# Patient Record
Sex: MALE | Race: WHITE | Employment: UNEMPLOYED | URBAN - METROPOLITAN AREA
[De-identification: names, ages, dates, MRNs, and addresses within clinical notes are randomized per-mention and may not be internally consistent; named-entity substitution may affect disease eponyms.]

---
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
## 2023-07-05 ENCOUNTER — HOSPITAL ENCOUNTER (OUTPATIENT)
Dept: DATA CONVERSION | Facility: HOSPITAL | Age: 18
End: 2023-07-05
Attending: ORTHOPAEDIC SURGERY | Admitting: ORTHOPAEDIC SURGERY
Payer: COMMERCIAL

## 2023-07-05 DIAGNOSIS — S43.431A SUPERIOR GLENOID LABRUM LESION OF RIGHT SHOULDER, INITIAL ENCOUNTER: ICD-10-CM

## 2023-08-24 ENCOUNTER — HOSPITAL ENCOUNTER (EMERGENCY)
Age: 18
Discharge: HOME | End: 2023-08-24
Payer: COMMERCIAL

## 2023-08-24 VITALS
WEIGHT: 180 LBS | TEMPERATURE: 98 F | BODY MASS INDEX: 25.77 KG/M2 | DIASTOLIC BLOOD PRESSURE: 68 MMHG | HEIGHT: 70 IN | OXYGEN SATURATION: 99 % | HEART RATE: 75 BPM | RESPIRATION RATE: 16 BRPM | SYSTOLIC BLOOD PRESSURE: 113 MMHG

## 2023-08-24 DIAGNOSIS — S01.81XA FACIAL LACERATION, INITIAL ENCOUNTER: Primary | ICD-10-CM

## 2023-08-24 PROCEDURE — 12011 RPR F/E/E/N/L/M 2.5 CM/<: CPT

## 2023-08-24 PROCEDURE — 99282 EMERGENCY DEPT VISIT SF MDM: CPT

## 2023-08-24 ASSESSMENT — ENCOUNTER SYMPTOMS
ABDOMINAL PAIN: 0
PHOTOPHOBIA: 0
VOICE CHANGE: 0
SINUS PRESSURE: 0
SHORTNESS OF BREATH: 0
COLOR CHANGE: 0
DIARRHEA: 0
ALLERGIC/IMMUNOLOGIC NEGATIVE: 1
FACIAL SWELLING: 0
SINUS PAIN: 0
RHINORRHEA: 0
EYE PAIN: 0
WHEEZING: 0
TROUBLE SWALLOWING: 0
VOMITING: 0
EYE DISCHARGE: 0
ABDOMINAL DISTENTION: 0
STRIDOR: 0
CHOKING: 0
COUGH: 0
CONSTIPATION: 0
SORE THROAT: 0
EYE REDNESS: 0
NAUSEA: 0
CHEST TIGHTNESS: 0
BLOOD IN STOOL: 0
EYE ITCHING: 0
APNEA: 0
BACK PAIN: 0

## 2023-08-24 ASSESSMENT — PAIN - FUNCTIONAL ASSESSMENT: PAIN_FUNCTIONAL_ASSESSMENT: NONE - DENIES PAIN

## 2023-08-24 NOTE — ED PROVIDER NOTES
[address] [facility] ED  EMERGENCY DEPARTMENT ENCOUNTER      Pt Name: Janay Haas  MRN: [id number]  [address] [facility] 2005  Date of evaluation: 8/24/2023  Provider: MARY Rivera CNP    CHIEF COMPLAINT       Chief Complaint   Patient presents with    Laceration         HISTORY OF PRESENT ILLNESS   (Location/Symptom, Timing/Onset, Context/Setting, Quality, Duration, Modifying Factors, Severity)  Note limiting factors. Janay Haas is a 21 y.o. male who, per chart review, has past medical history of shoulder surgery presents to the emergency department with c/o laceration to R eyebrow which occurred just prior to arrival in ED. Pt reports he was working on a car when he accidentally got hit in the head with a metal bar. Denies LOC. Denies any other injuries. He did not take any medications prior to arrival.  Immunizations are UTD     Pt denies chest pain, shortness of breath, palpitations, fevers, abdominal pain, nausea, vomiting, diarrhea, dysuria, hematuria, flank pain, incontinence. Nursing Notes were reviewed. REVIEW OF SYSTEMS    (2-9 systems for level 4, 10 or more for level 5)     Review of Systems   Constitutional:  Negative for chills, diaphoresis, fatigue and fever. HENT:  Negative for congestion, dental problem, drooling, ear discharge, ear pain, facial swelling, hearing loss, mouth sores, nosebleeds, postnasal drip, rhinorrhea, sinus pressure, sinus pain, sneezing, sore throat, tinnitus, trouble swallowing and voice change. Eyes:  Negative for photophobia, pain, discharge, redness, itching and visual disturbance. Respiratory:  Negative for apnea, cough, choking, chest tightness, shortness of breath, wheezing and stridor. Cardiovascular:  Negative for chest pain, palpitations and leg swelling. Gastrointestinal:  Negative for abdominal distention, abdominal pain, blood in stool, constipation, diarrhea, nausea and vomiting. Endocrine: Negative.     Genitourinary:

## 2023-10-02 NOTE — OP NOTE
Post Operative Note:     Post-Procedure Diagnosis: Right shoulder anterior  labral tear/Bankart tear   Procedure: 1.  Right shoulder arthroscopic anterior  labral repair/Bankart repair  2.  Right shoulder arthroscopic humeral capsule avulsion repair/HAGL  3.   4.   5.   Surgeon: Booker Lacey MD   Resident/Fellow/Other Assistant: Booker Mcghee PA-C   Estimated Blood Loss (mL): Minimal   Specimen: no   Findings: Anterior labral tear with hagl     Operative Report Dictated:  Dictation: not applicable - note contains Operative  Report   Note Recipients: Bhavana Florence MD - [phone]  [Preferred]   Operative Report:    Indications: 17-year-old male with recurrent right shoulder instability elected proceed with surgery for right shoulder arthroscopic labral repair/Bankart repair      Risks benefits and alternatives to surgery were discussed including but not limited to Infection, bleeding, neurovascular injury, pain and dysfunction, hardware related complications including cutout failure breakage, loss of function, motion, and permanent  disability as well as the cardiovascular and pulmonary complications from anesthesia including death and DVT. Patient and family accept these risks.  We discussed specifically hardware related complications including anchor cut out, failure, breakage, recurrent instability, posttraumatic arthritis, loss in strength, function or motion and the possible need for revision surgeries    Patient identified in the preop area.  Right upper extremity marked and confirmed with patient as the operative site.  Brought back to the operating room and anesthetized under general anesthesia.  Right upper extremity was then prepped and draped in standard sterile fashion.  Patient, site and procedure were confirmed with timeout.  Everyone in the room agreed.  Preop antibiotics were given.  Patient was given a preoperative scalene nerve block.  Placed into a lateral decubitus position with bony  prominences well-padded  Traction was then placed onto the arm    We then made standard glenohumeral working portals beginning posteriorly.  Then created 2 separate anterior mid glenoid portal.  Cannula was placed.  Glenohumeral findings: Patient had a notable anterior Bankart tear which extended from about 2:00 to 6:00.  Also had a notable capsular injury and the loss of some of the humeral attachment of the glenohumeral ligament just deep to subscapularis.  Patient had a notable tear in the anterior labrum which extended from 2:00 to 6:00.  The biceps anchor was intact.  We then sequentially begin elevating the labrum from superior to inferiorly to gain access to the glenoid neck.  Debrided the glenoid neck  with the shaver to increase to healing potential.  Once we had the capsule labrum elevated, we then placed sequential sutures.  Inferiorly we placed a cinch stitch with a 45 degree lasso and a fiber link.  This allowed the access the inferior aspect of  the labrum.  I placed an additional stitches superiorly in a similar sequential fashion.  Once we had full access control of the inferior aspect of the anterior labrum, we then began placing anchors.  Placed our inferior anchor first.  A drill guide was  placed at the glenoid face and unicortical he drilled.  The labrum was then advanced and the sutures were cut flush.  We placed the second anchor more superiorly to advance the capsule and the labrum.  [place] was placed and the sutures were cut flush.   Placed an additional superior most stitch to the labrum.  We used a additional fiber link cinch stitch.  It sequentially passed and placed through anchor.  Was advanced up on the face of the glenoid.  With excellent bite with all of our sutures and we  confirmed improved the stability in the repair of our labral tear.We then addressed his capsular evulsion/Hagl lesion.  Placed a fiber tack anchor just behind the subscapularis after gently debriding the anterior  aspect of the humeral head.  Patient had a capsular rent inferior which was identified.  With a [place] suture passer we placed limbs of our fiber tape through  the capsule.  These were sequentially tied intra-articularly.  Knots were cut after the capsule was advanced to the humerus.  Patient also small posterior tear in the labrum which was incomplete.  We placed the 2 separate posterior capsular stitches to repair the posterior portal as well as oversew the labral tear.  These were tied off of the capsule.  Then closed our posterior  portal through the cannula.  We sequentially placed deep stitch.  #2 Ethibond sutures were used posteriorly..  This concluded the procedure.  Wounds were irrigated with normal saline.  Portals were closed in standard fashion.  Sterile dressings were applied.  Patient placed into a sling.  Patient was then sent to the PACU in stable condition.    Booker Mcghee PA-C was present throughout the entire case. Given the nature of the disease process and the procedure, a skilled surgical first assistant was necessary during the case. The assistant was necessary to hold retractors and to manipulate  the extremity during the procedure. A certified scrub tech was at the back table managing the instruments and supplies for the surgical case.        Electronic Signatures:  Booker Lacey)  (Signed 05-Jul-2023 22:54)   Authored: Post Operative Note, Note Completion      Last Updated: 05-Jul-2023 22:54 by Booker Lacey)

## 2023-10-04 PROBLEM — H69.90 ETD (EUSTACHIAN TUBE DYSFUNCTION): Status: ACTIVE | Noted: 2023-10-04

## 2023-10-04 PROBLEM — T24.232A SECOND DEGREE BURN OF LEFT LOWER LEG: Status: ACTIVE | Noted: 2023-10-04

## 2023-10-04 PROBLEM — H66.90 OTITIS MEDIA, ACUTE: Status: ACTIVE | Noted: 2023-10-04

## 2023-10-04 PROBLEM — R25.1 OCCASIONAL TREMORS: Status: ACTIVE | Noted: 2023-10-04

## 2023-10-04 PROBLEM — H65.92 MIDDLE EAR EFFUSION, LEFT: Status: ACTIVE | Noted: 2023-10-04

## 2023-10-04 PROBLEM — G56.00 CTS (CARPAL TUNNEL SYNDROME): Status: ACTIVE | Noted: 2023-10-04

## 2023-10-04 PROBLEM — H92.09 OTALGIA: Status: ACTIVE | Noted: 2023-10-04

## 2023-10-04 PROBLEM — G89.18 ACUTE POSTOPERATIVE PAIN: Status: ACTIVE | Noted: 2023-10-04

## 2023-10-04 PROBLEM — E86.0 DEHYDRATION AFTER EXERTION: Status: ACTIVE | Noted: 2023-10-04

## 2023-10-04 PROBLEM — R22.31 MASS OF RIGHT WRIST: Status: ACTIVE | Noted: 2023-10-04

## 2023-10-04 PROBLEM — S43.439A GLENOID LABRUM TEAR: Status: ACTIVE | Noted: 2023-10-04

## 2023-10-04 PROBLEM — E16.2 HYPOGLYCEMIA: Status: ACTIVE | Noted: 2023-10-04

## 2023-10-04 PROBLEM — M19.039 ARTHRITIS, WRIST: Status: ACTIVE | Noted: 2023-10-04

## 2023-10-04 PROBLEM — H61.20 CERUMEN IMPACTION: Status: ACTIVE | Noted: 2023-10-04

## 2023-10-04 RX ORDER — ASPIRIN 81 MG
TABLET, DELAYED RELEASE (ENTERIC COATED) ORAL
COMMUNITY
Start: 2022-11-01 | End: 2024-06-07 | Stop reason: WASHOUT

## 2023-10-04 RX ORDER — MULTIVITAMIN
TABLET ORAL
COMMUNITY
Start: 2018-02-19 | End: 2024-06-07 | Stop reason: WASHOUT

## 2023-10-04 RX ORDER — OXYCODONE AND ACETAMINOPHEN 5; 325 MG/1; MG/1
1 TABLET ORAL EVERY 6 HOURS PRN
COMMUNITY
Start: 2023-07-03 | End: 2024-06-07 | Stop reason: WASHOUT

## 2023-10-06 ENCOUNTER — OFFICE VISIT (OUTPATIENT)
Dept: PEDIATRICS | Facility: CLINIC | Age: 18
End: 2023-10-06
Payer: COMMERCIAL

## 2023-10-06 VITALS
HEART RATE: 75 BPM | HEIGHT: 69 IN | BODY MASS INDEX: 24.88 KG/M2 | DIASTOLIC BLOOD PRESSURE: 70 MMHG | WEIGHT: 168 LBS | SYSTOLIC BLOOD PRESSURE: 124 MMHG

## 2023-10-06 DIAGNOSIS — E55.9 VITAMIN D DEFICIENCY: ICD-10-CM

## 2023-10-06 DIAGNOSIS — E55.9 VITAMIN D INSUFFICIENCY: ICD-10-CM

## 2023-10-06 DIAGNOSIS — Z13.0 ENCOUNTER FOR SCREENING FOR HEMATOLOGIC DISORDER: ICD-10-CM

## 2023-10-06 DIAGNOSIS — Z91.89 AT RISK FOR SIDE EFFECT OF MEDICATION: ICD-10-CM

## 2023-10-06 DIAGNOSIS — E78.00 HYPERCHOLESTEROLEMIA: ICD-10-CM

## 2023-10-06 DIAGNOSIS — Z13.220 ENCOUNTER FOR SCREENING FOR LIPID DISORDER: ICD-10-CM

## 2023-10-06 DIAGNOSIS — G89.18 ACUTE POSTOPERATIVE PAIN: ICD-10-CM

## 2023-10-06 DIAGNOSIS — E16.2 HYPOGLYCEMIA: ICD-10-CM

## 2023-10-06 DIAGNOSIS — S43.431S TEAR OF RIGHT GLENOID LABRUM, SEQUELA: ICD-10-CM

## 2023-10-06 DIAGNOSIS — Z00.00 EXAMINATION, ROUTINE, OVER 18 YEARS OF AGE: Primary | ICD-10-CM

## 2023-10-06 DIAGNOSIS — Z28.21 INFLUENZA VACCINATION DECLINED BY PATIENT: ICD-10-CM

## 2023-10-06 DIAGNOSIS — R79.89 LOW TESTOSTERONE IN MALE: ICD-10-CM

## 2023-10-06 DIAGNOSIS — Z78.9 TAKES DIETARY SUPPLEMENTS: ICD-10-CM

## 2023-10-06 PROBLEM — H66.90 OTITIS MEDIA, ACUTE: Status: RESOLVED | Noted: 2023-10-04 | Resolved: 2023-10-06

## 2023-10-06 PROBLEM — H61.20 CERUMEN IMPACTION: Status: RESOLVED | Noted: 2023-10-04 | Resolved: 2023-10-06

## 2023-10-06 PROBLEM — T24.232A SECOND DEGREE BURN OF LEFT LOWER LEG: Status: RESOLVED | Noted: 2023-10-04 | Resolved: 2023-10-06

## 2023-10-06 PROBLEM — H65.92 MIDDLE EAR EFFUSION, LEFT: Status: RESOLVED | Noted: 2023-10-04 | Resolved: 2023-10-06

## 2023-10-06 PROBLEM — E86.0 DEHYDRATION AFTER EXERTION: Status: RESOLVED | Noted: 2023-10-04 | Resolved: 2023-10-06

## 2023-10-06 PROBLEM — R25.1 OCCASIONAL TREMORS: Status: RESOLVED | Noted: 2023-10-04 | Resolved: 2023-10-06

## 2023-10-06 PROBLEM — R22.31 MASS OF RIGHT WRIST: Status: RESOLVED | Noted: 2023-10-04 | Resolved: 2023-10-06

## 2023-10-06 PROBLEM — H69.90 ETD (EUSTACHIAN TUBE DYSFUNCTION): Status: RESOLVED | Noted: 2023-10-04 | Resolved: 2023-10-06

## 2023-10-06 PROBLEM — H92.09 OTALGIA: Status: RESOLVED | Noted: 2023-10-04 | Resolved: 2023-10-06

## 2023-10-06 PROCEDURE — 3008F BODY MASS INDEX DOCD: CPT | Performed by: PEDIATRICS

## 2023-10-06 PROCEDURE — 99395 PREV VISIT EST AGE 18-39: CPT | Performed by: PEDIATRICS

## 2023-10-06 NOTE — PROGRESS NOTES
Patient ID: Merritt Brown is a 18 y.o. male who presents for Annual Exam (Wants blood work).  Today he is un-accompanied.        HERE FOR 19 YO ANNUAL VISIT    LAST WELL VISIT      1.  Right shoulder dislocation June 2023   S/p  right shoulder arthroscopic labral repair/Bankart repair July 5, 2023   Physical therapy since then  Last seen by ortho Sept 15, 2023   Still unable to move right shoulder through full range of motion   PT has not cleared   Does not lift   Only cardiovascular activity       2. 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    The patient denies all TB risk factors     Mental Health:  A screening questionnaire for depression was negative.      Tobacco:  Denies use  Alcohol:  Denies use  Drugs:  Denies use  Sexual activity:  Denies current or past sexual activity    Diet:   Using pre-work out    The patient was advised to consume 3 servings of green vegetables per day (if not, adherence with a MVI was stressed).  The patient was advised to consume a minimum of 2 servings of meat per week (if not, adherence with a MVI was stressed).  The patient was advised to assure 1000 mg of Calcium and 1000 IU's of Vitamin D per day.  Discussion of supplementing with Caltrate-D was advised is dairy product consumption is not sufficient.  All concerns and questions regarding diet, nutrition, and eating habits were addressed.     Elimination:  The patient denies concerns regarding chronic constipation or diarrhea.  Voiding:  The patient denies  concerns regarding urination or urinary symptoms.  Sleep:  The patient denies concerns regarding sleep; specifically there are no issues regarding the patients ability to fall asleep, stay asleep, or sleep throughout the night.    HOME LIFE:  There are no concerns with regards to the patient's relationships at home.    Current Outpatient Medications:     carbamide peroxide (Debrox) 6.5 % otic solution, Administer into affected ear(s)., Disp: , Rfl:     multivitamin tablet, Take by mouth., Disp: , Rfl:     oxyCODONE-acetaminophen (Percocet) 5-325 mg tablet, Take 1 tablet by mouth every 6 hours if needed., Disp: , Rfl:     pediatric multivitamin tablet chewable split tablet, Take by mouth., Disp: , Rfl:     Past Medical History:   Diagnosis Date    Achilles tendinitis, unspecified leg 03/21/2018    Achilles tendinitis    Acute upper respiratory infection, unspecified 04/11/2019    Acute upper respiratory infection    Allergic contact dermatitis due to plants, except food 08/05/2015    Contact dermatitis due to poison ivy    Body mass index (BMI) pediatric, 5th percentile to less than 85th percentile for age 10/16/2019    BMI (body mass index), pediatric, 5% to less than 85% for age    Encounter for immunization 11/20/2020    Encounter for administration of vaccine    Encounter for routine child health examination without abnormal findings 10/17/2019    Encounter for routine child health examination without abnormal findings    Encounter for routine child health examination without abnormal findings 11/17/2017    Encounter for routine child health examination w/o abnormal findings    Hypoglycemia 10/04/2023    Iliotibial band syndrome, unspecified leg 03/17/2017    Iliotibial band syndrome    Immunization not carried out because of patient decision for unspecified reason 10/16/2019    Immunization not carried out because of patient decision    Other acute nonsuppurative otitis media, unspecified ear 08/05/2015    Acute  nonsuppurative otitis media    Other conditions influencing health status 02/22/2017    History of cough    Other infective otitis externa, unspecified ear 07/29/2013    Infective otitis externa    Otitis media, unspecified, left ear 04/11/2019    Acute left otitis media    Otitis media, unspecified, unspecified ear 10/16/2019    Recurrent acute otitis media    Overweight 11/10/2017    Overweight, pediatric, BMI 85.0-94.9 percentile for age    Pain in left hip 03/17/2017    Left hip pain in pediatric patient    Pain in unspecified ankle and joints of unspecified foot 03/21/2018    Ankle pain    Personal history of diseases of the skin and subcutaneous tissue 11/18/2021    History of cellulitis    Personal history of diseases of the skin and subcutaneous tissue 09/21/2016    History of impetigo    Personal history of diseases of the skin and subcutaneous tissue 07/24/2015    History of sunburn    Personal history of other diseases of the nervous system and sense organs 02/22/2017    History of acute otitis media    Personal history of other diseases of the nervous system and sense organs 07/29/2013    History of acute otitis media    Personal history of other diseases of the respiratory system 02/22/2017    History of acute pharyngitis    Personal history of other diseases of the respiratory system 11/03/2021    History of acute pharyngitis    Personal history of other diseases of the respiratory system 02/18/2014    History of upper respiratory infection    Personal history of other drug therapy 11/10/2017    History of influenza vaccination    Personal history of other infectious and parasitic diseases 09/21/2016    History of viral exanthem    Rash and other nonspecific skin eruption 09/21/2016    Rash    Unspecified hearing loss, unspecified ear 11/03/2014    Hearing difficulty    Unspecified injury of left foot, initial encounter 02/19/2018    Injury of small toe, left, initial encounter    Unspecified injury of  "unspecified ankle, initial encounter 03/21/2018    Ankle injury       Past Surgical History:   Procedure Laterality Date    TONSILLECTOMY  04/24/2013    Tonsillectomy With Adenoidectomy    TYMPANOSTOMY TUBE PLACEMENT  04/24/2013    Ear Pressure Equalization Tube       Family History   Problem Relation Name Age of Onset    Asthma Sister      Asthma Brother      Other (Blood pressure isolated elevated) Maternal Grandmother      Hyperlipidemia Maternal Grandmother      Other (Myocardial infarctin) Maternal Grandfather         Social History     Tobacco Use    Smoking status: Never     Passive exposure: Never    Smokeless tobacco: Never   Vaping Use    Vaping Use: Never used       Objective   /70   Pulse 75   Ht 1.753 m (5' 9\")   Wt 76.2 kg (168 lb)   BMI 24.81 kg/m²   BSA: 1.93 meters squared        BMI: Body mass index is 24.81 kg/m².   Growth percentiles: Height:  Facility age limit for growth %lyndsey is 20 years.   Weight:  Facility age limit for growth %lyndsey is 20 years.  BMI:  Facility age limit for growth %lyndsey is 20 years.    PHYSICAL EXAM  General  General Appearance - Not in acute distress, Not Irritable, Not Lethargic / Slow.  Mental Status - Alert.  Build & Nutrition - Well developed and Well nourished.  Hydration - Well hydrated.    Integumentary  - - warm and dry with no rashes, normal skin turgor and scalp and hair without rash, or lesion.    Head and Neck  - - normalocephalic, neck supple, thyroid normal size and consistancy and no lymphadenopathy.  Head    Fontanelles and Sutures: Anterior [place] - Characteristics - closed. Posterior [place] - Characteristics - closed.  Neck  Global Assessment - full range of motion, non-tender, No lymphadenopathy, no nucchal rigidty, no torticollis.  Trachea - midline.    Eye  - - Bilateral - pupils equal and round (No strabismus), sclera clear and lids pink without edema or mass.  Fundi - Bilateral - Normal.    ENMT  - - Bilateral - TM pearly grey with " good light reflex, external auditory canal pink and dry, nasopharynx moist and pink and oropharynx moist and pink, tonsils normal, uvula midline .  Ears  Pinna - Bilateral - no generalized tenderness observed. External Auditory Canal - Bilateral - no edema noted in EAC, no drainage observed.  Mouth and Throat  Oral Cavity/Oropharynx - Hard Palate - no asymmetry observed, no erythema noted. Soft Palate - no asymmetry noted, no erythema noted. Oral Mucosa - moist.    Chest and Lung Exam  - - Bilateral - clear to auscultation, normal breathing effort and no chest deformity.  Inspection  Movements - Normal and Symmetrical. Accessory muscles - No use of accessory muscles in breathing.    Breast  - - Bilateral - symmetry, no mass palpable, no skin change and no nipple discharge.    Cardiovascular  - - regular rate and rhythm and no murmur, rub, or thrill.    Abdomen  - - soft, nontender, normal bowel sounds and no hepatomegaly, splenomegaly, or mass.  Inspection  Inspection of the abdomen reveals - No Abnormal pulsations, No Paradoxical movements and No Hernias. Skin - Inspection of the skin of the abdomen reveals - No Stria and No Ecchymoses.  Palpation/Percussion  Palpation and Percussion of the abdomen reveal - Soft, Non Tender, No Rebound tenderness, No Rigidity (guarding), No Abnormal dullness to percussion, No Abnormal tympany to percussion, No hepatosplenomegaly, No Palpable abdominal masses and No Subcutaneous crepitus.  Auscultation  Auscultation of the abdomen reveals - Bowel sounds normal, No Abdominal bruits and No Venous hums.    Male Genitourinary  - - Bilateral - normal circumcised phallus, testicle smooth, round, and normal size and no palpable inguinal hernia.  Evaluation of genitourinary system reveals - scrotum non-tender, no masses, normal testes, no palpable masses, urethral meatus normal, no discharge, normal penis and normal anus and perineum, no lesions.  Sexual Maturity  Ty 5 - Adult hair  pattern, Adult penile size and shape and Adult testicular size and shape.    Peripheral Vascular  - - Bilateral - peripheral pulses palpable in upper and lower extremity and no edema present.  Upper Extremity  Inspection - Bilateral - No Cyanotic nailbeds, No Delayed capillary refill, no Digital clubbing, No Erythema, Not Pale, No Petechiae. Palpation - Temperature - Bilateral - Normal.  Lower Extremity  Inspection - Bilateral - No Cyanotic nailbeds, No Delayed capillary refill, No Erythema, Not Pale. Palpation - Temperature - Bilateral - Normal.    Neurologic  - - normal sensation, cranial nerves II-XII intact and deep tendon reflexes normal.  Neurologic evaluation reveals  - normal sensation, normal coordination and upper and lower extremity deep tendon reflexes intact bilaterally .  Mental Status  Affect - normal. Speech - Normal. Thought content/perception - Normal. Cognitive function - Normal.  Cranial Nerves  III Oculomotor - Pupillary constriction - Bilateral - Normal. Eye Movements - Nystagmus - Bilateral - None.  Overall Assessment of Muscle Strength and Tone reveals  Upper Extremities - Right Deltoid - 5/5. Left Deltoid - 5/5. Right Bicep - 5/5. Left Bicep - 5/5. Right Tricep - 5/5. Left Tricep - 5/5. Right Intrinsics - 5/5. Left Intrinsics - 5/5. Lower Extremities - Right Iliopsoas - 5/5. Left Iliopsoas - 5/5. Right Quadriceps - 5/5. Left Quadriceps - 5/5. Right Hamstrings - 5/5. Left Hamstrings - 5/5. Right Tibialis Anterior - 5/5. Left Tibialis Anterior - 5/5. Right Gastroc-Soleus - 5/5. Left Gastroc-Soleus - 5/5.  Meningeal Signs - None.    Musculoskeletal  UNABLE TO EXAMINE RIGHT SHOULDER DUE TO PAIN AND LIMITED MOTION   - - normal posture, normal gait and station, Head and neck are symmetric, no deformities, masses or tenderness, Head and neck show normal ROM without pain or weakness, Spine shows normal curvatures full ROM without pain or weakness, Upper extremities show normal ROM without pain or  weakness, Lower extremities show full ROM without pain or weakness and Patient is able to heel walk, toe walk, and duck walk.  Lower Extremity  Hip - Examination of the right hip reveals - no instability, subluxation or laxity. Examination of the left hip reveals - no instability, subluxation or laxity.    Lymphatic  - - Bilateral - no lymphadenopathy.        Assessment/Plan   Problem List Items Addressed This Visit       RESOLVED: Hypoglycemia    Relevant Orders    Gamma-Glutamyl Transferase    Amylase    Glenoid labrum tear    Acute postoperative pain     Other Visit Diagnoses       Examination, routine, over 18 years of age    -  Primary    Relevant Orders    Vitamin D 25-Hydroxy,Total (for eval of Vitamin D levels)    Lipid Panel    CBC and Auto Differential    Gamma-Glutamyl Transferase    Amylase    Vitamin D deficiency        Relevant Orders    Vitamin D 25-Hydroxy,Total (for eval of Vitamin D levels)    Encounter for screening for lipid disorder        Relevant Orders    Lipid Panel    Gamma-Glutamyl Transferase    Amylase    Encounter for screening for hematologic disorder        Relevant Orders    CBC and Auto Differential    Pediatric body mass index (BMI) of 5th percentile to less than 85th percentile for age        Influenza vaccination declined by patient                Immunization History   Administered Date(s) Administered    DTaP vaccine, pediatric  (INFANRIX) 2003, 04/05/2004, 01/03/2005, 09/29/2008    Flu vaccine (IIV4), preservative free *Check age/dose* 11/17/2015, 11/09/2016, 11/10/2017, 10/01/2018, 01/19/2023    HPV, Quadrivalent 10/31/2014, 01/02/2015, 05/04/2015    Hep A, Unspecified 09/21/2012, 03/26/2013    Hepatitis B vaccine, adult (RECOMBIVAX, ENGERIX) 2003, 04/05/2004    HiB PRP-OMP conjugate vaccine, pediatric (PEDVAXHIB) 2003, 01/03/2005    Influenza, Unspecified 09/28/2011, 09/21/2012    Influenza, seasonal, injectable 09/06/2013, 10/31/2014    MMR vaccine,  "subcutaneous (MMR II) 09/27/2004, 09/29/2008    Meningococcal ACWY vaccine (MENVEO) 10/16/2019    Meningococcal B vaccine (BEXSERO) 10/19/2020, 12/14/2020    Meningococcal MCV4P 10/31/2014    Pneumococcal Conjugate PCV 7 2003, 08/16/2004, 01/03/2005    Poliovirus vaccine, subcutaneous (IPOL) 2003, 04/05/2004, 09/29/2008    Tdap vaccine, age 7 year and older (BOOSTRIX) 10/31/2014    Varicella vaccine, subcutaneous (VARIVAX) 09/27/2004, 09/29/2008     History of previous adverse reactions to immunizations? no  The following portions of the patient's history were reviewed by a provider in this encounter and updated as appropriate:       Well Child 12-22 Year    Objective   Vitals:    10/06/23 1109   BP: 124/70   Pulse: 75   Weight: 76.2 kg (168 lb)   Height: 1.753 m (5' 9\")     Growth parameters are noted and are appropriate for age.    Assessment/Plan   18 YEAR OLD male for annual well visit  Normal growth   Normal development   Immunizations: up to date; influenza declined   Vision and hearing: no concerns  STD done in past, not sexually active    Mom's concern since child using pre-workout supplement   Screening lipid/cmp/cbc ordered     Depression screen low rosk     1. Anticipatory guidance discussed.  Gave handout on well-child issues at this age.  Specific topics reviewed: drugs, ETOH, and tobacco, importance of regular dental care, importance of regular exercise, importance of varied diet, limit TV, media violence, minimize junk food, seat belts, sex; STD and pregnancy prevention, and testicular self-exam.  2.  Weight management:  The patient was counseled regarding nutrition and physical activity.  3. Development: appropriate for age  4.   Orders Placed This Encounter   Procedures    Vitamin D 25-Hydroxy,Total (for eval of Vitamin D levels)    Lipid Panel    CBC and Auto Differential    Gamma-Glutamyl Transferase    Amylase     5. Follow-up visit in 1 year for next well child visit, or sooner as " needed.      Advised to transition to adult care physician by next year    Bhavana Florence MD        ADDENDUM 11/6/2023 REVIEWED FINAL LAB RESULTS   Hypercholeterolemia   Vitamin D insufficiency 20   Low total testosterone     My chart message sent to patient regarding low testosterone.   Order for endocrinology referral in chart  Advised to stop otc pre-workout supplement for     Bhavana Florence MD

## 2023-10-13 ENCOUNTER — TREATMENT (OUTPATIENT)
Dept: PHYSICAL THERAPY | Facility: CLINIC | Age: 18
End: 2023-10-13
Payer: COMMERCIAL

## 2023-10-13 DIAGNOSIS — M25.511 RIGHT SHOULDER PAIN: ICD-10-CM

## 2023-10-13 DIAGNOSIS — S43.431D TYPE I SLAP LESION, RIGHT, SUBSEQUENT ENCOUNTER: Primary | ICD-10-CM

## 2023-10-13 PROCEDURE — 97110 THERAPEUTIC EXERCISES: CPT | Mod: GP,CQ

## 2023-10-13 ASSESSMENT — PAIN - FUNCTIONAL ASSESSMENT: PAIN_FUNCTIONAL_ASSESSMENT: 0-10

## 2023-10-13 ASSESSMENT — PAIN SCALES - GENERAL: PAINLEVEL_OUTOF10: 0 - NO PAIN

## 2023-10-13 NOTE — PROGRESS NOTES
Physical Therapy Treatment    Patient Name: Merritt Brown  MRN: [id number]  Today's Date: 10/13/2023  Time Calculation  Start Time: 0805  Stop Time: 0845  Time Calculation (min): 40 min    Current Problem  1. Type I SLAP lesion, right, subsequent encounter        2. Right shoulder pain            Subjective   General   Pt doing well at this point.  Denies issues other than weakness.   Precautions  Precautions  Precautions Comment: None  Pain  Pain Assessment: 0-10  Pain Score: 0 - No pain  Insurance  Visit number: 8   Approved number of visits: 30   Authorization not required after evaluation   [place]/MMO     Objective   Treatments:   UBE f/r ML5 3'/3'  Row w/ ER BkTB x30  LAE BkTB x30  H.AB BkTB x30  OH press w/ ant anchor BkTB x30  PNF diag BkTB x30  SA slides BTB x30  Plank to hi-lo sidestepping BTB 6L  Plank SA press 2x15  Prone I,T,Y 3# 2x10  Prone W hands behind head 2x15  Wall angels x30  OH press 5# x30  Supine lat pullovers 10# x30  Jobes 4# x20  Bodyblade OH 2-way TF x3  Bodyblade IR/ER TF x2    Assessment    Pt without pain today.  Tolerating progression extremely well.  Showing good overall GH and scapular control, but still with some weaknesses throughout.  Discussed gradual return to recreational gym activity.    Plan:  Progress with POC as tolerated.   Focus on scapular retraction/depression particularly working away from body and OH.    OP EDUCATION:       Goals:

## 2023-10-20 ENCOUNTER — TREATMENT (OUTPATIENT)
Dept: PHYSICAL THERAPY | Facility: CLINIC | Age: 18
End: 2023-10-20
Payer: COMMERCIAL

## 2023-10-20 DIAGNOSIS — M25.511 RIGHT SHOULDER PAIN: ICD-10-CM

## 2023-10-20 DIAGNOSIS — S43.431D TYPE I SLAP LESION, RIGHT, SUBSEQUENT ENCOUNTER: Primary | ICD-10-CM

## 2023-10-20 PROCEDURE — 97110 THERAPEUTIC EXERCISES: CPT | Mod: GP,CQ

## 2023-10-20 ASSESSMENT — PAIN - FUNCTIONAL ASSESSMENT: PAIN_FUNCTIONAL_ASSESSMENT: 0-10

## 2023-10-20 ASSESSMENT — PAIN SCALES - GENERAL: PAINLEVEL_OUTOF10: 0 - NO PAIN

## 2023-10-20 NOTE — PROGRESS NOTES
"Physical Therapy Treatment    Patient Name: Merritt Brown  MRN: [id number]  Today's Date: 10/20/2023  Time Calculation  Start Time: 0800  Stop Time: 0825  Time Calculation (min): 25 min    Current Problem  1. Type I SLAP lesion, right, subsequent encounter        2. Right shoulder pain            Subjective   General   Pt doing well overall.  Denies much in the way of issue.  Has been working and going to the gym regularly without problem.   Precautions  Precautions  Precautions Comment: None  Pain  Pain Assessment: 0-10  Pain Score: 0 - No pain  Insurance  Visit number: 9  Approved number of visits: 30   Authorization not required after evaluation   [place]/MMO    Objective   Treatments:   UBE f/r ML6 3'/3'  Row w/ ER BkTB x30  ER w/ punch BkTB x30  LAE BkTB -  H.AB BkTB --  OH press w/ ant anchor BkTB --  PNF diag BkTB --  SA slides BTB --  Plank to hi-lo sidestepping BTB --  Plank SA press --  Prone I,T,Y 3# --  Prone W hands behind head 2x15  Wall angels x30  OH press 5# --  Supine lat pullovers 10# --  Jobes 4# --  Bodyblade OH 2-way TF x3  Bodyblade IR/ER TF x2  OH PB dribble 30\"    ROM WNL     Strength 5/5 with exception rhomboids, MT, LT 4+/5,     QuickDASH: 10/20/23 0.00%     Assessment    Pt doing extremely well at this point.  No pain with activity and showing good GH/parascapular control and sequencing.  Discussed increasing recreational gym activity, exercise management, and gradual return to PLOF.  Discussed some additional bodyweight and endurance focused exercises to progress tolerance to \"odd positioning\" required by occupation.     Plan:  Discharge pt; pt achieved all goals established.        OP EDUCATION:       Goals:   Pain: Patient report 0/10 pain with all work activities for evidence of improved stability and tolerance to full duty  work     MET    Participation Restrictions: Decrease DASH to < or equal to 6% disabled for increased functional ability    MET    Range Of " Motion/Joint Mobility: Increase AROM right shoulder flexion and abduction to 150 degrees, ER @ 90 degrees abd to > or equal to 80 degrees, and HBB to > or equal to T12 for ease with reaching OH and behind back    MET    Strength: Increase strength right deltoid and RC to 5/5 without pain for ease with lifting and stabilizing for tolerance to OH activities     MET    HEP, Independent with HEP to expedite progress and promote goal achievement    MET    Postural strength, Increase rylie-scapular strength to 4+/5 for ease with lifting and stabilizing OH for work tasks   MET

## 2023-10-24 ENCOUNTER — PATIENT MESSAGE (OUTPATIENT)
Dept: PEDIATRICS | Facility: CLINIC | Age: 18
End: 2023-10-24

## 2023-10-24 ENCOUNTER — LAB (OUTPATIENT)
Dept: LAB | Facility: HOSPITAL | Age: 18
End: 2023-10-24
Payer: COMMERCIAL

## 2023-10-24 DIAGNOSIS — Z00.00 EXAMINATION, ROUTINE, OVER 18 YEARS OF AGE: ICD-10-CM

## 2023-10-24 DIAGNOSIS — Z78.9 TAKES DIETARY SUPPLEMENTS: ICD-10-CM

## 2023-10-24 DIAGNOSIS — Z13.0 ENCOUNTER FOR SCREENING FOR HEMATOLOGIC DISORDER: ICD-10-CM

## 2023-10-24 DIAGNOSIS — Z91.89 AT RISK FOR SIDE EFFECT OF MEDICATION: ICD-10-CM

## 2023-10-24 DIAGNOSIS — Z13.220 ENCOUNTER FOR SCREENING FOR LIPID DISORDER: ICD-10-CM

## 2023-10-24 DIAGNOSIS — E55.9 VITAMIN D DEFICIENCY: ICD-10-CM

## 2023-10-24 DIAGNOSIS — E16.2 HYPOGLYCEMIA: ICD-10-CM

## 2023-10-24 LAB
25(OH)D3 SERPL-MCNC: 20 NG/ML (ref 30–100)
AMYLASE SERPL-CCNC: 30 U/L (ref 29–103)
BASOPHILS # BLD AUTO: 0.02 X10*3/UL (ref 0–0.1)
BASOPHILS NFR BLD AUTO: 0.3 %
CHOLEST SERPL-MCNC: 196 MG/DL (ref 0–199)
CHOLESTEROL/HDL RATIO: 15
EOSINOPHIL # BLD AUTO: 0.06 X10*3/UL (ref 0–0.7)
EOSINOPHIL NFR BLD AUTO: 0.9 %
ERYTHROCYTE [DISTWIDTH] IN BLOOD BY AUTOMATED COUNT: 13.3 % (ref 11.5–14.5)
ESTRADIOL SERPL-MCNC: <19 PG/ML
GGT SERPL-CCNC: 9 U/L (ref 5–64)
HCT VFR BLD AUTO: 44.6 % (ref 41–52)
HDLC SERPL-MCNC: 13.1 MG/DL
HGB BLD-MCNC: 15 G/DL (ref 13.5–17.5)
IMM GRANULOCYTES # BLD AUTO: 0.01 X10*3/UL (ref 0–0.7)
IMM GRANULOCYTES NFR BLD AUTO: 0.2 % (ref 0–0.9)
LDLC SERPL CALC-MCNC: 156 MG/DL
LYMPHOCYTES # BLD AUTO: 1.44 X10*3/UL (ref 1.2–4.8)
LYMPHOCYTES NFR BLD AUTO: 22 %
MCH RBC QN AUTO: 31.2 PG (ref 26–34)
MCHC RBC AUTO-ENTMCNC: 33.6 G/DL (ref 32–36)
MCV RBC AUTO: 93 FL (ref 80–100)
MONOCYTES # BLD AUTO: 0.59 X10*3/UL (ref 0.1–1)
MONOCYTES NFR BLD AUTO: 9 %
NEUTROPHILS # BLD AUTO: 4.44 X10*3/UL (ref 1.2–7.7)
NEUTROPHILS NFR BLD AUTO: 67.6 %
NON HDL CHOLESTEROL: 183 MG/DL (ref 0–119)
NRBC BLD-RTO: 0 /100 WBCS (ref 0–0)
PLATELET # BLD AUTO: 268 X10*3/UL (ref 150–450)
PMV BLD AUTO: 10.2 FL (ref 7.5–11.5)
RBC # BLD AUTO: 4.81 X10*6/UL (ref 4.5–5.9)
TRIGL SERPL-MCNC: 133 MG/DL (ref 0–149)
TSH SERPL-ACNC: 0.8 MIU/L (ref 0.44–3.98)
VLDL: 27 MG/DL (ref 0–40)
WBC # BLD AUTO: 6.6 X10*3/UL (ref 4.4–11.3)

## 2023-10-24 PROCEDURE — 36415 COLL VENOUS BLD VENIPUNCTURE: CPT

## 2023-10-24 PROCEDURE — 82670 ASSAY OF TOTAL ESTRADIOL: CPT | Mod: ELYLAB

## 2023-10-24 PROCEDURE — 82306 VITAMIN D 25 HYDROXY: CPT

## 2023-10-24 PROCEDURE — 85025 COMPLETE CBC W/AUTO DIFF WBC: CPT

## 2023-10-24 PROCEDURE — 82150 ASSAY OF AMYLASE: CPT

## 2023-10-24 PROCEDURE — 82977 ASSAY OF GGT: CPT

## 2023-10-24 PROCEDURE — 80061 LIPID PANEL: CPT

## 2023-10-24 PROCEDURE — 84402 ASSAY OF FREE TESTOSTERONE: CPT

## 2023-10-24 PROCEDURE — 84443 ASSAY THYROID STIM HORMONE: CPT

## 2023-10-27 ENCOUNTER — APPOINTMENT (OUTPATIENT)
Dept: PHYSICAL THERAPY | Facility: CLINIC | Age: 18
End: 2023-10-27
Payer: COMMERCIAL

## 2023-10-27 LAB
TESTOSTERONE FREE (CHAN): 35.4 PG/ML (ref 35–155)
TESTOSTERONE,TOTAL,LC-MS/MS: 105 NG/DL (ref 250–1100)

## 2023-11-03 ENCOUNTER — APPOINTMENT (OUTPATIENT)
Dept: PHYSICAL THERAPY | Facility: CLINIC | Age: 18
End: 2023-11-03
Payer: COMMERCIAL

## 2023-11-13 NOTE — PROGRESS NOTES
11/13/2023 see my chart message.     Patient prefers to stop taking otc supplement and recheck levels.   Patient wants to hold off on being seen by Endocrinology at this time.     Follow up labs ordered for after Jan 2024     Bhavana Florence MD

## 2023-11-17 ENCOUNTER — ANCILLARY PROCEDURE (OUTPATIENT)
Dept: RADIOLOGY | Facility: CLINIC | Age: 18
End: 2023-11-17
Payer: COMMERCIAL

## 2023-11-17 ENCOUNTER — OFFICE VISIT (OUTPATIENT)
Dept: ORTHOPEDIC SURGERY | Facility: CLINIC | Age: 18
End: 2023-11-17
Payer: COMMERCIAL

## 2023-11-17 DIAGNOSIS — M25.511 RIGHT SHOULDER PAIN, UNSPECIFIED CHRONICITY: Primary | ICD-10-CM

## 2023-11-17 DIAGNOSIS — M25.511 RIGHT SHOULDER PAIN, UNSPECIFIED CHRONICITY: ICD-10-CM

## 2023-11-17 PROCEDURE — 73030 X-RAY EXAM OF SHOULDER: CPT | Mod: RIGHT SIDE | Performed by: ORTHOPAEDIC SURGERY

## 2023-11-17 PROCEDURE — 73030 X-RAY EXAM OF SHOULDER: CPT | Mod: RT

## 2023-11-17 PROCEDURE — 99213 OFFICE O/P EST LOW 20 MIN: CPT | Mod: 25,27 | Performed by: ORTHOPAEDIC SURGERY

## 2023-11-17 PROCEDURE — 3008F BODY MASS INDEX DOCD: CPT | Performed by: ORTHOPAEDIC SURGERY

## 2023-11-17 PROCEDURE — 99213 OFFICE O/P EST LOW 20 MIN: CPT | Performed by: ORTHOPAEDIC SURGERY

## 2023-11-17 NOTE — PROGRESS NOTES
History of Present Illness   Patient presents for follow-up from his right shoulder Bankart repair.  States that he is done with physical therapy he has been lifting at the gym not doing any type of Olympic lifting.  States that he denies any instability the shoulder.     Review of Systems   GENERAL: Negative for malaise, significant weight loss, fever  MUSCULOSKELETAL: see HPI  NEURO:  Negative     Physical Exam  General appearance well-nourished well-developed ANO x3 no acute distress  Right shoulder exam shows incisions well-healed.  Full active range of motion of the shoulder 5 out of 5 strength with manual muscle strength testing good stability when testing the joint capsule.  Upper extremity gross neurovascular intact     Imaging  Intact labral repair     Assessment   Status post right shoulder Bankart repair     Plan  I encouraged the patient to keep working on strength and endurance.  Avoid Olympic type lifting.  At this point we will see the patient back on as-needed basis.  Ice and ibuprofen for pain relief    Past Medical History:   Diagnosis Date    Achilles tendinitis, unspecified leg 03/21/2018    Achilles tendinitis    Acute upper respiratory infection, unspecified 04/11/2019    Acute upper respiratory infection    Allergic contact dermatitis due to plants, except food 08/05/2015    Contact dermatitis due to poison ivy    Body mass index (BMI) pediatric, 5th percentile to less than 85th percentile for age 10/16/2019    BMI (body mass index), pediatric, 5% to less than 85% for age    Encounter for immunization 11/20/2020    Encounter for administration of vaccine    Encounter for routine child health examination without abnormal findings 10/17/2019    Encounter for routine child health examination without abnormal findings    Encounter for routine child health examination without abnormal findings 11/17/2017    Encounter for routine child health examination w/o abnormal findings    Hypoglycemia  10/04/2023    Iliotibial band syndrome, unspecified leg 03/17/2017    Iliotibial band syndrome    Immunization not carried out because of patient decision for unspecified reason 10/16/2019    Immunization not carried out because of patient decision    Other acute nonsuppurative otitis media, unspecified ear 08/05/2015    Acute nonsuppurative otitis media    Other conditions influencing health status 02/22/2017    History of cough    Other infective otitis externa, unspecified ear 07/29/2013    Infective otitis externa    Otitis media, unspecified, left ear 04/11/2019    Acute left otitis media    Otitis media, unspecified, unspecified ear 10/16/2019    Recurrent acute otitis media    Overweight 11/10/2017    Overweight, pediatric, BMI 85.0-94.9 percentile for age    Pain in left hip 03/17/2017    Left hip pain in pediatric patient    Pain in unspecified ankle and joints of unspecified foot 03/21/2018    Ankle pain    Personal history of diseases of the skin and subcutaneous tissue 11/18/2021    History of cellulitis    Personal history of diseases of the skin and subcutaneous tissue 09/21/2016    History of impetigo    Personal history of diseases of the skin and subcutaneous tissue 07/24/2015    History of sunburn    Personal history of other diseases of the nervous system and sense organs 02/22/2017    History of acute otitis media    Personal history of other diseases of the nervous system and sense organs 07/29/2013    History of acute otitis media    Personal history of other diseases of the respiratory system 02/22/2017    History of acute pharyngitis    Personal history of other diseases of the respiratory system 11/03/2021    History of acute pharyngitis    Personal history of other diseases of the respiratory system 02/18/2014    History of upper respiratory infection    Personal history of other drug therapy 11/10/2017    History of influenza vaccination    Personal history of other infectious and  parasitic diseases 09/21/2016    History of viral exanthem    Rash and other nonspecific skin eruption 09/21/2016    Rash    Unspecified hearing loss, unspecified ear 11/03/2014    Hearing difficulty    Unspecified injury of left foot, initial encounter 02/19/2018    Injury of small toe, left, initial encounter    Unspecified injury of unspecified ankle, initial encounter 03/21/2018    Ankle injury       Medication Documentation Review Audit    **Prior to Admission medications have not yet been reviewed**         No Known Allergies    Social History     Socioeconomic History    Marital status: Single     Spouse name: Not on file    Number of children: Not on file    Years of education: Not on file    Highest education level: Not on file   Occupational History    Not on file   Tobacco Use    Smoking status: Never     Passive exposure: Never    Smokeless tobacco: Never   Vaping Use    Vaping Use: Never used   Substance and Sexual Activity    Alcohol use: Not on file    Drug use: Not on file    Sexual activity: Not Currently   Other Topics Concern    Not on file   Social History Narrative    Not on file     Social Determinants of Health     Financial Resource Strain: Not on file   Food Insecurity: Not on file   Transportation Needs: Not on file   Physical Activity: Not on file   Stress: Not on file   Social Connections: Not on file   Intimate Partner Violence: Not on file   Housing Stability: Not on file       Past Surgical History:   Procedure Laterality Date    TONSILLECTOMY  04/24/2013    Tonsillectomy With Adenoidectomy    TYMPANOSTOMY TUBE PLACEMENT  04/24/2013    Ear Pressure Equalization Tube

## 2023-11-27 ENCOUNTER — OFFICE VISIT (OUTPATIENT)
Dept: PEDIATRICS | Facility: CLINIC | Age: 18
End: 2023-11-27
Payer: COMMERCIAL

## 2023-11-27 VITALS
DIASTOLIC BLOOD PRESSURE: 67 MMHG | TEMPERATURE: 98.7 F | BODY MASS INDEX: 25.4 KG/M2 | HEART RATE: 89 BPM | SYSTOLIC BLOOD PRESSURE: 128 MMHG | WEIGHT: 172 LBS | OXYGEN SATURATION: 97 %

## 2023-11-27 DIAGNOSIS — R30.0 DYSURIA: ICD-10-CM

## 2023-11-27 DIAGNOSIS — R25.1 TREMORS OF NERVOUS SYSTEM: Primary | ICD-10-CM

## 2023-11-27 DIAGNOSIS — Z11.3 ROUTINE SCREENING FOR STI (SEXUALLY TRANSMITTED INFECTION): ICD-10-CM

## 2023-11-27 LAB
APPEARANCE UR: CLEAR
BILIRUB UR STRIP.AUTO-MCNC: NEGATIVE MG/DL
COLOR UR: YELLOW
GLUCOSE UR STRIP.AUTO-MCNC: NEGATIVE MG/DL
KETONES UR STRIP.AUTO-MCNC: NEGATIVE MG/DL
LEUKOCYTE ESTERASE UR QL STRIP.AUTO: NEGATIVE
MUCOUS THREADS #/AREA URNS AUTO: NORMAL /LPF
NITRITE UR QL STRIP.AUTO: NEGATIVE
PH UR STRIP.AUTO: 7 [PH]
POC APPEARANCE, URINE: CLEAR
POC BILIRUBIN, URINE: NEGATIVE
POC BLOOD, URINE: NEGATIVE
POC COLOR, URINE: YELLOW
POC GLUCOSE, URINE: NEGATIVE MG/DL
POC KETONES, URINE: NEGATIVE MG/DL
POC LEUKOCYTES, URINE: NEGATIVE
POC NITRITE,URINE: NEGATIVE
POC PH, URINE: 7.5 PH
POC PROTEIN, URINE: ABNORMAL MG/DL
POC SPECIFIC GRAVITY, URINE: 1.02
POC UROBILINOGEN, URINE: 1 EU/DL
PROT UR STRIP.AUTO-MCNC: ABNORMAL MG/DL
RBC # UR STRIP.AUTO: NEGATIVE /UL
RBC #/AREA URNS AUTO: NORMAL /HPF
SP GR UR STRIP.AUTO: 1.02
UROBILINOGEN UR STRIP.AUTO-MCNC: 2 MG/DL
WBC #/AREA URNS AUTO: NORMAL /HPF

## 2023-11-27 PROCEDURE — 87086 URINE CULTURE/COLONY COUNT: CPT

## 2023-11-27 PROCEDURE — 81001 URINALYSIS AUTO W/SCOPE: CPT

## 2023-11-27 PROCEDURE — 3008F BODY MASS INDEX DOCD: CPT | Performed by: PEDIATRICS

## 2023-11-27 PROCEDURE — 81002 URINALYSIS NONAUTO W/O SCOPE: CPT | Performed by: PEDIATRICS

## 2023-11-27 PROCEDURE — 1036F TOBACCO NON-USER: CPT | Performed by: PEDIATRICS

## 2023-11-27 PROCEDURE — 99214 OFFICE O/P EST MOD 30 MIN: CPT | Performed by: PEDIATRICS

## 2023-11-27 PROCEDURE — 87800 DETECT AGNT MULT DNA DIREC: CPT

## 2023-11-27 ASSESSMENT — ENCOUNTER SYMPTOMS
FACIAL ASYMMETRY: 0
TREMORS: 1
ACTIVITY CHANGE: 0
DIFFICULTY URINATING: 0
VOMITING: 0
APPETITE CHANGE: 0
SHORTNESS OF BREATH: 0
FEVER: 0
HEADACHES: 0
DIARRHEA: 0
HEMATURIA: 0
ABDOMINAL PAIN: 0
LIGHT-HEADEDNESS: 1
FATIGUE: 1
NUMBNESS: 0
WEAKNESS: 0
DYSURIA: 1
SPEECH DIFFICULTY: 0
UNEXPECTED WEIGHT CHANGE: 0
DIZZINESS: 1
BACK PAIN: 1

## 2023-11-27 NOTE — PROGRESS NOTES
Subjective   Patient ID: Merritt Brown is a 18 y.o. male who presents for Dizziness (Patient is here for dizziness happening all the time.)  Here with Mom     Dizziness  Associated symptoms include fatigue. Pertinent negatives include no abdominal pain, chest pain, congestion, fever, headaches, numbness, vomiting or weakness.     HERE FOR new onset fatigue with intentional tremors   -seen at well visit in office Oct 6, 2023 : at that time, had screening labs to screen since patient taking pre-work out regimen   -labs done Oct 24, 2023:   Normal except Vitamin D low 20, since notified, taking vitamin D supplement   Stopped taking pre-work out supplement for past 1 week   Since October with fatigue and others noticing with tremors   Child with long history of poor sleep, will sleep only 5-6 hours,  wakes at night.   Tremors not all the time but more frequent.   Dad noticed while patient working at work with patient.   Patient , no known new exposures at work.   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     Work out same   Shoulder surgery: August surgery, out until 3months ago ;    Not feeling tired   When started working out   On pre work out for about 1 week  No change in dose   No weakness, feeling shaky, light headed    Uncle and sister with too low glucose; eating frequently   Eating all day long  Drinking plenty of fluids  No caffeine   Energy drink once a week         Back pain for few days   Mom worried about possible uti   Some pain in past   No recent swimming   No lake swimming      More urinary   No pain with urine dark yellow;        No recent injury          Gdno recent strep infection    Home covid      Sleep:   Sleep 6 hours;   3-4 months, unable to sleep through the night   Wake up through the night     Shaky:  hands with  tremors for few     Pgf with tremors      \when doing things      No ms or als in family                Review of Systems   Constitutional:  Positive for fatigue. Negative for activity change, appetite change, fever and unexpected weight change.   HENT:  Negative for congestion and hearing loss.    Eyes:  Negative for visual disturbance.   Respiratory:  Negative for shortness of breath.    Cardiovascular:  Negative for chest pain.   Gastrointestinal:  Negative for abdominal pain, diarrhea and vomiting.   Genitourinary:  Positive for dysuria and urgency. Negative for decreased urine volume, difficulty urinating, hematuria and testicular pain.   Musculoskeletal:  Positive for back pain. Negative for gait problem.   Neurological:  Positive for dizziness, tremors and light-headedness. Negative for syncope, facial asymmetry, speech difficulty, weakness, numbness and headaches.       Vitals:    11/27/23 1151   BP: 128/67   Pulse: 89   Temp: 37.1 °C (98.7 °F)   SpO2: 97%   Weight: 78 kg (172 lb)       Objective   Physical Exam  Vitals and nursing note reviewed.   Constitutional:       General: He is not in acute distress.     Appearance: Normal appearance. He is well-developed. He is not toxic-appearing.   HENT:      Head: Normocephalic and atraumatic.      Right Ear: Tympanic membrane and external ear normal.      Left Ear: Tympanic membrane and external ear normal.      Nose: Nose normal.      Mouth/Throat:      Mouth: Mucous membranes are moist.      Pharynx: Oropharynx is clear. No oropharyngeal exudate or posterior oropharyngeal erythema.      Tonsils: No tonsillar exudate. 2+ on the right. 2+ on the left.   Eyes:      Extraocular Movements: Extraocular movements intact.      Conjunctiva/sclera: Conjunctivae normal.   Cardiovascular:      Rate and Rhythm: Normal rate and regular rhythm.      Pulses: Normal pulses.      Heart sounds: Normal heart sounds. No murmur heard.  Pulmonary:      Effort: Pulmonary effort is  normal.      Breath sounds: Normal breath sounds.   Genitourinary:     Comments: Ty 4  Musculoskeletal:      Cervical back: Neck supple.   Lymphadenopathy:      Cervical: No cervical adenopathy.   Skin:     General: Skin is warm and dry.      Findings: No rash.   Neurological:      General: No focal deficit present.      Mental Status: He is alert and oriented to person, place, and time. Mental status is at baseline.      Cranial Nerves: Cranial nerves 2-12 are intact.      Gait: Gait is intact.      Comments: No tremor noted in office    Psychiatric:         Attention and Perception: Attention normal.         Mood and Affect: Mood normal.         Speech: Speech normal.                Assessment/Plan   Problem List Items Addressed This Visit    None  Visit Diagnoses       Dysuria    -  Primary    Relevant Orders    POCT UA (nonautomated) manually resulted (Completed)    Urine culture    Urinalysis with Reflex Microscopic    Tremors of nervous system        Relevant Orders    Referral to Neurology    POCT UA (nonautomated) manually resulted (Completed)    Urine culture    Urinalysis with Reflex Microscopic              Current Outpatient Medications:     carbamide peroxide (Debrox) 6.5 % otic solution, Administer into affected ear(s)., Disp: , Rfl:     multivitamin tablet, Take by mouth., Disp: , Rfl:     oxyCODONE-acetaminophen (Percocet) 5-325 mg tablet, Take 1 tablet by mouth every 6 hours if needed., Disp: , Rfl:     pediatric multivitamin tablet chewable split tablet, Take by mouth., Disp: , Rfl:     MDM    1. Urinary urgency and frequency with new lower back pain  -afebrile and non-toxic  -discussed doubt uti but will send urine for ua and culture to r/o uti   -h/o sexually active, urine sent for gc/ct screen         2. Intentional tremors x 2 months with fatigue   -recent labs 1 month ago normal cbcd, cmp, tsh   -history of otc pre-work out use: instructed to discontinue for now   -only small intermittent  caffeine use  -Adult neuro referral for possible movement disorder; referral given   -return prn     Recommended transition to adult care pcp shanthi Florence MD

## 2023-11-28 LAB
BACTERIA UR CULT: NORMAL
C TRACH RRNA SPEC QL NAA+PROBE: NEGATIVE
N GONORRHOEA DNA SPEC QL PROBE+SIG AMP: NEGATIVE

## 2024-02-19 ENCOUNTER — OFFICE VISIT (OUTPATIENT)
Dept: ORTHOPEDIC SURGERY | Facility: CLINIC | Age: 19
End: 2024-02-19
Payer: COMMERCIAL

## 2024-02-19 DIAGNOSIS — S43.431D TYPE I SLAP LESION, RIGHT, SUBSEQUENT ENCOUNTER: Primary | ICD-10-CM

## 2024-02-19 PROCEDURE — 3008F BODY MASS INDEX DOCD: CPT | Performed by: ORTHOPAEDIC SURGERY

## 2024-02-19 PROCEDURE — 99214 OFFICE O/P EST MOD 30 MIN: CPT | Performed by: ORTHOPAEDIC SURGERY

## 2024-02-19 PROCEDURE — 1036F TOBACCO NON-USER: CPT | Performed by: ORTHOPAEDIC SURGERY

## 2024-02-19 RX ORDER — IBUPROFEN 800 MG/1
800 TABLET ORAL EVERY 8 HOURS PRN
Qty: 90 TABLET | Refills: 0 | Status: SHIPPED | OUTPATIENT
Start: 2024-02-19 | End: 2024-03-20

## 2024-02-19 RX ORDER — METHYLPREDNISOLONE 4 MG/1
TABLET ORAL
Qty: 21 TABLET | Refills: 0 | Status: SHIPPED | OUTPATIENT
Start: 2024-02-19 | End: 2024-06-07 | Stop reason: WASHOUT

## 2024-02-19 NOTE — PROGRESS NOTES
History of Present Illness   Chief Complaint   Patient presents with    Right Shoulder - Follow-up     Right shoulder Bankart repair 7/5/23       History of Present Illness   Patient presents today endorsing shoulder pain.  The pain is worse with overhead activity and tends to wake the patient at night. The pain is sharp in nature, localizes lateral and deep.  Better with rest.    Patient is of the right shoulder is been sore uncomfortable for the about 2 months gets aching pain and soreness in daily activities.  He states that done well since his surgery but just started to become more consistently painful at the end of a long day  Imaging  Shoulder: No acute fractures dislocations.  No arthritic change.     Assessment:   Right shoulder labral repair/Bankart repair    Plan:  We reviewed the role of imaging, physical therapy, injections and the time frame to healing and correlation with outcome.  1.  Medrol Dosepak: Medication benefits and risks discussed  2.  NSAID: Ibuprofen sent to the pharmacy.  GI side effects and medical risks discussed  3.  Ice: 30 minutes on and off  4.  Exercise home program: Medically directed Shoulder therapy / Handout given  5.  Follow-up in 4 weeks.  Mechanical symptoms persist will get MRI right shoulder     Physical Exam  Well-nourished, well-developed. No acute distress. Alert and oriented x3. Responds appropriately to questioning. Good mood. Normal affect.  Physical Exam  Right shoulder:  Skin healthy to gross inspection. No ecchymosis, no swelling, no gross atrophy  No tenderness to palpation over acromioclavicular joint  Mild pain in the biceps  ROM: 130 forward flexion  Strength: 5/5 Resisted elevation, external rotation   Negative apprehension.  Slight pain with jerk test.  Normal stability  Pain with lift off and Speeds test + impingement  Negative Spurling´s test  Positive Neer and Hawkin´s test  Neurovascular exam normal distally     Review of Systems   GENERAL: Negative  for malaise, significant weight loss, fever  MUSCULOSKELETAL: See HPI  NEURO:  Negative     Past Medical History:   Diagnosis Date    Achilles tendinitis, unspecified leg 03/21/2018    Achilles tendinitis    Acute upper respiratory infection, unspecified 04/11/2019    Acute upper respiratory infection    Allergic contact dermatitis due to plants, except food 08/05/2015    Contact dermatitis due to poison ivy    Body mass index (BMI) pediatric, 5th percentile to less than 85th percentile for age 10/16/2019    BMI (body mass index), pediatric, 5% to less than 85% for age    Encounter for immunization 11/20/2020    Encounter for administration of vaccine    Encounter for routine child health examination without abnormal findings 10/17/2019    Encounter for routine child health examination without abnormal findings    Encounter for routine child health examination without abnormal findings 11/17/2017    Encounter for routine child health examination w/o abnormal findings    Hypoglycemia 10/04/2023    Iliotibial band syndrome, unspecified leg 03/17/2017    Iliotibial band syndrome    Immunization not carried out because of patient decision for unspecified reason 10/16/2019    Immunization not carried out because of patient decision    Other acute nonsuppurative otitis media, unspecified ear 08/05/2015    Acute nonsuppurative otitis media    Other conditions influencing health status 02/22/2017    History of cough    Other infective otitis externa, unspecified ear 07/29/2013    Infective otitis externa    Otitis media, unspecified, left ear 04/11/2019    Acute left otitis media    Otitis media, unspecified, unspecified ear 10/16/2019    Recurrent acute otitis media    Overweight 11/10/2017    Overweight, pediatric, BMI 85.0-94.9 percentile for age    Pain in left hip 03/17/2017    Left hip pain in pediatric patient    Pain in unspecified ankle and joints of unspecified foot 03/21/2018    Ankle pain    Personal history of  diseases of the skin and subcutaneous tissue 11/18/2021    History of cellulitis    Personal history of diseases of the skin and subcutaneous tissue 09/21/2016    History of impetigo    Personal history of diseases of the skin and subcutaneous tissue 07/24/2015    History of sunburn    Personal history of other diseases of the nervous system and sense organs 02/22/2017    History of acute otitis media    Personal history of other diseases of the nervous system and sense organs 07/29/2013    History of acute otitis media    Personal history of other diseases of the respiratory system 02/22/2017    History of acute pharyngitis    Personal history of other diseases of the respiratory system 11/03/2021    History of acute pharyngitis    Personal history of other diseases of the respiratory system 02/18/2014    History of upper respiratory infection    Personal history of other drug therapy 11/10/2017    History of influenza vaccination    Personal history of other infectious and parasitic diseases 09/21/2016    History of viral exanthem    Rash and other nonspecific skin eruption 09/21/2016    Rash    Unspecified hearing loss, unspecified ear 11/03/2014    Hearing difficulty    Unspecified injury of left foot, initial encounter 02/19/2018    Injury of small toe, left, initial encounter    Unspecified injury of unspecified ankle, initial encounter 03/21/2018    Ankle injury       Medication Documentation Review Audit       Reviewed by Lisa Dunham MA (Medical Assistant) on 11/27/23 at 1151      Medication Order Taking? Sig Documenting Provider Last Dose Status   carbamide peroxide (Debrox) 6.5 % otic solution [id number]  Administer into affected ear(s). Historical Provider, MD  Active   multivitamin tablet [id number]  Take by mouth. Historical Provider, MD  Active   oxyCODONE-acetaminophen (Percocet) 5-325 mg tablet [id number]  Take 1 tablet by mouth every 6 hours if needed. Historical Provider, MD  Active   pediatric  multivitamin tablet chewable split tablet [id number]  Take by mouth. Historical Provider, MD  Active                    No Known Allergies    Social History     Socioeconomic History    Marital status: Single     Spouse name: Not on file    Number of children: Not on file    Years of education: Not on file    Highest education level: Not on file   Occupational History    Not on file   Tobacco Use    Smoking status: Never     Passive exposure: Never    Smokeless tobacco: Never   Vaping Use    Vaping Use: Never used   Substance and Sexual Activity    Alcohol use: Not on file    Drug use: Not on file    Sexual activity: Not Currently   Other Topics Concern    Not on file   Social History Narrative    Not on file     Social Determinants of Health     Financial Resource Strain: Not on file   Food Insecurity: Not on file   Transportation Needs: Not on file   Physical Activity: Not on file   Stress: Not on file   Social Connections: Not on file   Intimate Partner Violence: Not on file   Housing Stability: Not on file       Past Surgical History:   Procedure Laterality Date    TONSILLECTOMY  04/24/2013    Tonsillectomy With Adenoidectomy    TYMPANOSTOMY TUBE PLACEMENT  04/24/2013    Ear Pressure Equalization Tube       No results found.

## 2024-02-23 ENCOUNTER — APPOINTMENT (OUTPATIENT)
Dept: ORTHOPEDIC SURGERY | Facility: CLINIC | Age: 19
End: 2024-02-23
Payer: COMMERCIAL

## 2024-03-07 ENCOUNTER — APPOINTMENT (OUTPATIENT)
Dept: RADIOLOGY | Facility: HOSPITAL | Age: 19
End: 2024-03-07
Payer: COMMERCIAL

## 2024-03-07 ENCOUNTER — HOSPITAL ENCOUNTER (EMERGENCY)
Facility: HOSPITAL | Age: 19
Discharge: HOME | End: 2024-03-07
Payer: COMMERCIAL

## 2024-03-07 ENCOUNTER — APPOINTMENT (OUTPATIENT)
Dept: NEUROLOGY | Facility: CLINIC | Age: 19
End: 2024-03-07
Payer: COMMERCIAL

## 2024-03-07 VITALS
HEIGHT: 69 IN | SYSTOLIC BLOOD PRESSURE: 129 MMHG | RESPIRATION RATE: 20 BRPM | BODY MASS INDEX: 26.66 KG/M2 | TEMPERATURE: 98.2 F | HEART RATE: 80 BPM | OXYGEN SATURATION: 98 % | WEIGHT: 180 LBS | DIASTOLIC BLOOD PRESSURE: 60 MMHG

## 2024-03-07 DIAGNOSIS — S61.219A FINGER LACERATION, INITIAL ENCOUNTER: Primary | ICD-10-CM

## 2024-03-07 PROCEDURE — 2500000001 HC RX 250 WO HCPCS SELF ADMINISTERED DRUGS (ALT 637 FOR MEDICARE OP): Performed by: REGISTERED NURSE

## 2024-03-07 PROCEDURE — 2500000005 HC RX 250 GENERAL PHARMACY W/O HCPCS: Performed by: REGISTERED NURSE

## 2024-03-07 PROCEDURE — 73130 X-RAY EXAM OF HAND: CPT | Mod: RIGHT SIDE | Performed by: RADIOLOGY

## 2024-03-07 PROCEDURE — 73130 X-RAY EXAM OF HAND: CPT | Mod: RT

## 2024-03-07 PROCEDURE — 90715 TDAP VACCINE 7 YRS/> IM: CPT | Performed by: REGISTERED NURSE

## 2024-03-07 PROCEDURE — 99283 EMERGENCY DEPT VISIT LOW MDM: CPT | Mod: 25

## 2024-03-07 PROCEDURE — 2500000004 HC RX 250 GENERAL PHARMACY W/ HCPCS (ALT 636 FOR OP/ED): Performed by: REGISTERED NURSE

## 2024-03-07 PROCEDURE — 90471 IMMUNIZATION ADMIN: CPT | Performed by: REGISTERED NURSE

## 2024-03-07 PROCEDURE — 12001 RPR S/N/AX/GEN/TRNK 2.5CM/<: CPT | Performed by: REGISTERED NURSE

## 2024-03-07 RX ORDER — LIDOCAINE HYDROCHLORIDE 10 MG/ML
2 INJECTION INFILTRATION; PERINEURAL ONCE
Status: COMPLETED | OUTPATIENT
Start: 2024-03-07 | End: 2024-03-07

## 2024-03-07 RX ORDER — BACITRACIN 500 [USP'U]/G
OINTMENT TOPICAL ONCE
Status: COMPLETED | OUTPATIENT
Start: 2024-03-07 | End: 2024-03-07

## 2024-03-07 RX ADMIN — TETANUS TOXOID, REDUCED DIPHTHERIA TOXOID AND ACELLULAR PERTUSSIS VACCINE, ADSORBED 0.5 ML: 5; 2.5; 8; 8; 2.5 SUSPENSION INTRAMUSCULAR at 16:21

## 2024-03-07 RX ADMIN — BACITRACIN: 500 OINTMENT TOPICAL at 16:21

## 2024-03-07 RX ADMIN — LIDOCAINE HYDROCHLORIDE 20 MG: 10 INJECTION, SOLUTION INFILTRATION; PERINEURAL at 16:21

## 2024-03-07 ASSESSMENT — PAIN SCALES - GENERAL: PAINLEVEL_OUTOF10: 0 - NO PAIN

## 2024-03-07 ASSESSMENT — COLUMBIA-SUICIDE SEVERITY RATING SCALE - C-SSRS
2. HAVE YOU ACTUALLY HAD ANY THOUGHTS OF KILLING YOURSELF?: NO
1. IN THE PAST MONTH, HAVE YOU WISHED YOU WERE DEAD OR WISHED YOU COULD GO TO SLEEP AND NOT WAKE UP?: NO
6. HAVE YOU EVER DONE ANYTHING, STARTED TO DO ANYTHING, OR PREPARED TO DO ANYTHING TO END YOUR LIFE?: NO

## 2024-03-07 ASSESSMENT — PAIN - FUNCTIONAL ASSESSMENT: PAIN_FUNCTIONAL_ASSESSMENT: 0-10

## 2024-03-07 NOTE — DISCHARGE INSTRUCTIONS
Please return to the emergency department present to an urgent care or follow-up with your PCP for suture removal in 10 days.

## 2024-03-07 NOTE — ED PROVIDER NOTES
HPI   Chief Complaint   Patient presents with    Laceration     R middle finger       18-year-old male presents emergency department today for evaluation of laceration to right middle finger.  Patient tells me he was working on a vehicle and while using a hammer he missed and struck his right middle finger at the second joint line.  Bleeding is controlled on arrival.  Patient is unsure of his last tetanus shot.      History provided by:  Parent and patient   used: No                        No data recorded                   Patient History   Past Medical History:   Diagnosis Date    Achilles tendinitis, unspecified leg 03/21/2018    Achilles tendinitis    Acute upper respiratory infection, unspecified 04/11/2019    Acute upper respiratory infection    Allergic contact dermatitis due to plants, except food 08/05/2015    Contact dermatitis due to poison ivy    Body mass index (BMI) pediatric, 5th percentile to less than 85th percentile for age 10/16/2019    BMI (body mass index), pediatric, 5% to less than 85% for age    Encounter for immunization 11/20/2020    Encounter for administration of vaccine    Encounter for routine child health examination without abnormal findings 10/17/2019    Encounter for routine child health examination without abnormal findings    Encounter for routine child health examination without abnormal findings 11/17/2017    Encounter for routine child health examination w/o abnormal findings    Hypoglycemia 10/04/2023    Iliotibial band syndrome, unspecified leg 03/17/2017    Iliotibial band syndrome    Immunization not carried out because of patient decision for unspecified reason 10/16/2019    Immunization not carried out because of patient decision    Other acute nonsuppurative otitis media, unspecified ear 08/05/2015    Acute nonsuppurative otitis media    Other conditions influencing health status 02/22/2017    History of cough    Other infective otitis externa,  unspecified ear 07/29/2013    Infective otitis externa    Otitis media, unspecified, left ear 04/11/2019    Acute left otitis media    Otitis media, unspecified, unspecified ear 10/16/2019    Recurrent acute otitis media    Overweight 11/10/2017    Overweight, pediatric, BMI 85.0-94.9 percentile for age    Pain in left hip 03/17/2017    Left hip pain in pediatric patient    Pain in unspecified ankle and joints of unspecified foot 03/21/2018    Ankle pain    Personal history of diseases of the skin and subcutaneous tissue 11/18/2021    History of cellulitis    Personal history of diseases of the skin and subcutaneous tissue 09/21/2016    History of impetigo    Personal history of diseases of the skin and subcutaneous tissue 07/24/2015    History of sunburn    Personal history of other diseases of the nervous system and sense organs 02/22/2017    History of acute otitis media    Personal history of other diseases of the nervous system and sense organs 07/29/2013    History of acute otitis media    Personal history of other diseases of the respiratory system 02/22/2017    History of acute pharyngitis    Personal history of other diseases of the respiratory system 11/03/2021    History of acute pharyngitis    Personal history of other diseases of the respiratory system 02/18/2014    History of upper respiratory infection    Personal history of other drug therapy 11/10/2017    History of influenza vaccination    Personal history of other infectious and parasitic diseases 09/21/2016    History of viral exanthem    Rash and other nonspecific skin eruption 09/21/2016    Rash    Unspecified hearing loss, unspecified ear 11/03/2014    Hearing difficulty    Unspecified injury of left foot, initial encounter 02/19/2018    Injury of small toe, left, initial encounter    Unspecified injury of unspecified ankle, initial encounter 03/21/2018    Ankle injury     Past Surgical History:   Procedure Laterality Date    TONSILLECTOMY   04/24/2013    Tonsillectomy With Adenoidectomy    TYMPANOSTOMY TUBE PLACEMENT  04/24/2013    Ear Pressure Equalization Tube     Family History   Problem Relation Name Age of Onset    Asthma Sister      Asthma Brother      Other (Blood pressure isolated elevated) Maternal Grandmother      Hyperlipidemia Maternal Grandmother      Other (Myocardial infarctin) Maternal Grandfather       Social History     Tobacco Use    Smoking status: Never     Passive exposure: Never    Smokeless tobacco: Never   Vaping Use    Vaping Use: Never used   Substance Use Topics    Alcohol use: Not on file    Drug use: Not on file       Physical Exam   ED Triage Vitals [03/07/24 1543]   Temperature Heart Rate Respirations BP   36.8 °C (98.2 °F) 80 20 129/60      Pulse Ox Temp Source Heart Rate Source Patient Position   98 % Temporal -- --      BP Location FiO2 (%)     -- --       Physical Exam  Vitals and nursing note reviewed.   Constitutional:       Appearance: Normal appearance.   Cardiovascular:      Rate and Rhythm: Normal rate.      Pulses: Normal pulses.   Pulmonary:      Effort: Pulmonary effort is normal.   Musculoskeletal:      Right hand: Laceration (1.5 cm laceration over second joint line of the right middle finger) present. Normal range of motion. Normal strength. Normal sensation. Normal capillary refill.      Left hand: Normal.   Skin:     Capillary Refill: Capillary refill takes less than 2 seconds.   Neurological:      General: No focal deficit present.      Mental Status: He is alert and oriented to person, place, and time.   Psychiatric:         Mood and Affect: Mood normal.         Behavior: Behavior normal.         ED Course & MDM   Diagnoses as of 03/07/24 1740   Finger laceration, initial encounter       Medical Decision Making  Patient seen examined emergency department; patient is healthy and nontoxic in appearance not appearing acute distress.  Patient has a 1.5 cm laceration to lateral adage of right middle finger  right at the second joint line.  Bleeding is controlled.  MSPs are intact.  Patient is able to extend and flex finger.  Will obtain imaging to evaluate for foreign body and/or fracture.  Patient will be updated on tetanus.    Imaging of right hand without any foreign body or acute fracture of the right middle finger.  Patient sutured at bedside please see procedural note.  I discussed with the patient the importance of follow up for his wound.  I instructed the patient to keep the wound clean and dry, not to get it wet for 24 hours, and not to soak it under water until sutures removed.  I also shared tips to reduce scarring, including applying antibiotic ointment multiple times per day, avoiding direct sun exposure to healing wound, and applying Mederma after sutures are removed.  Signs of infection were given, as well as reasons to return to the ED. patient was sent with remainder of the bacitracin tube to use at home.  I did educate on the use.  Patient's right index finger placed in aluminum splint by bedside nurse MSPs intact before and after placement.  All patient's questions and concerns were addressed prior to discharge.  Patient discharged home in stable condition.            Procedure  Laceration Repair    Performed by: MAVIS Fernandez  Authorized by: MAVIS Fernandez    Consent:     Consent obtained:  Verbal    Consent given by:  Patient    Risks, benefits, and alternatives were discussed: yes      Risks discussed:  Infection, pain, need for additional repair and nerve damage    Alternatives discussed:  No treatment  Universal protocol:     Procedure explained and questions answered to patient or proxy's satisfaction: yes      Relevant documents present and verified: yes      Test results available: yes      Imaging studies available: yes      Required blood products, implants, devices, and special equipment available: yes      Site/side marked: yes      Immediately prior to  procedure, a time out was called: yes      Patient identity confirmed:  Verbally with patient and arm band  Anesthesia:     Anesthesia method:  Local infiltration    Local anesthetic:  Lidocaine 1% w/o epi  Laceration details:     Location:  Finger    Finger location:  R index finger    Length (cm):  1.5  Pre-procedure details:     Preparation:  Patient was prepped and draped in usual sterile fashion  Exploration:     Limited defect created (wound extended): yes      Imaging obtained: x-ray      Imaging outcome: foreign body not noted      Wound exploration: wound explored through full range of motion      Contaminated: yes    Treatment:     Area cleansed with:  Chlorhexidine    Amount of cleaning:  Extensive    Irrigation solution:  Sterile saline    Irrigation method:  Pressure wash    Debridement:  None    Undermining:  None    Scar revision: no    Skin repair:     Repair method:  Sutures    Suture size:  3-0    Suture material:  Nylon    Number of sutures:  5  Approximation:     Approximation:  Close  Repair type:     Repair type:  Simple  Post-procedure details:     Dressing:  Antibiotic ointment, splint for protection and adhesive bandage    Procedure completion:  Tolerated       Teena Knapp, KALEB-CNP  03/07/24 7080

## 2024-03-25 ENCOUNTER — APPOINTMENT (OUTPATIENT)
Dept: ORTHOPEDIC SURGERY | Facility: CLINIC | Age: 19
End: 2024-03-25
Payer: COMMERCIAL

## 2024-05-19 ENCOUNTER — HOSPITAL ENCOUNTER (EMERGENCY)
Age: 19
Discharge: HOME | End: 2024-05-20
Attending: EMERGENCY MEDICINE
Payer: COMMERCIAL

## 2024-05-19 ENCOUNTER — APPOINTMENT (OUTPATIENT)
Dept: GENERAL RADIOLOGY | Age: 19
End: 2024-05-19
Payer: COMMERCIAL

## 2024-05-19 DIAGNOSIS — S43.014A ANTERIOR DISLOCATION OF RIGHT SHOULDER, INITIAL ENCOUNTER: Primary | ICD-10-CM

## 2024-05-19 PROCEDURE — 96375 TX/PRO/DX INJ NEW DRUG ADDON: CPT

## 2024-05-19 PROCEDURE — 99153 MOD SED SAME PHYS/QHP EA: CPT

## 2024-05-19 PROCEDURE — 6360000002 HC RX W HCPCS: Performed by: EMERGENCY MEDICINE

## 2024-05-19 PROCEDURE — 23650 CLTX SHO DSLC W/MNPJ WO ANES: CPT

## 2024-05-19 PROCEDURE — 96374 THER/PROPH/DIAG INJ IV PUSH: CPT

## 2024-05-19 PROCEDURE — 99152 MOD SED SAME PHYS/QHP 5/>YRS: CPT

## 2024-05-19 PROCEDURE — 73030 X-RAY EXAM OF SHOULDER: CPT

## 2024-05-19 PROCEDURE — 2580000003 HC RX 258: Performed by: EMERGENCY MEDICINE

## 2024-05-19 PROCEDURE — 99285 EMERGENCY DEPT VISIT HI MDM: CPT

## 2024-05-19 RX ORDER — 0.9 % SODIUM CHLORIDE 0.9 %
1000 INTRAVENOUS SOLUTION INTRAVENOUS ONCE
Status: COMPLETED | OUTPATIENT
Start: 2024-05-19 | End: 2024-05-20

## 2024-05-19 RX ORDER — MORPHINE SULFATE 4 MG/ML
4 INJECTION, SOLUTION INTRAMUSCULAR; INTRAVENOUS ONCE
Status: COMPLETED | OUTPATIENT
Start: 2024-05-19 | End: 2024-05-19

## 2024-05-19 RX ORDER — ONDANSETRON 2 MG/ML
4 INJECTION INTRAMUSCULAR; INTRAVENOUS ONCE
Status: COMPLETED | OUTPATIENT
Start: 2024-05-19 | End: 2024-05-19

## 2024-05-19 RX ORDER — KETOROLAC TROMETHAMINE 30 MG/ML
30 INJECTION, SOLUTION INTRAMUSCULAR; INTRAVENOUS ONCE
Status: COMPLETED | OUTPATIENT
Start: 2024-05-19 | End: 2024-05-19

## 2024-05-19 RX ADMIN — KETOROLAC TROMETHAMINE 30 MG: 30 INJECTION, SOLUTION INTRAMUSCULAR at 23:53

## 2024-05-19 RX ADMIN — MORPHINE SULFATE 4 MG: 4 INJECTION INTRAVENOUS at 23:53

## 2024-05-19 RX ADMIN — ONDANSETRON 4 MG: 2 INJECTION INTRAMUSCULAR; INTRAVENOUS at 23:53

## 2024-05-19 RX ADMIN — SODIUM CHLORIDE 1000 ML: 9 INJECTION, SOLUTION INTRAVENOUS at 23:53

## 2024-05-19 ASSESSMENT — PAIN DESCRIPTION - LOCATION: LOCATION: SHOULDER

## 2024-05-19 ASSESSMENT — PAIN - FUNCTIONAL ASSESSMENT: PAIN_FUNCTIONAL_ASSESSMENT: 0-10

## 2024-05-19 ASSESSMENT — PAIN DESCRIPTION - PAIN TYPE: TYPE: ACUTE PAIN

## 2024-05-19 ASSESSMENT — PAIN DESCRIPTION - DESCRIPTORS: DESCRIPTORS: SHARP

## 2024-05-19 ASSESSMENT — PAIN DESCRIPTION - FREQUENCY: FREQUENCY: CONTINUOUS

## 2024-05-19 ASSESSMENT — LIFESTYLE VARIABLES
HOW MANY STANDARD DRINKS CONTAINING ALCOHOL DO YOU HAVE ON A TYPICAL DAY: PATIENT DOES NOT DRINK
HOW OFTEN DO YOU HAVE A DRINK CONTAINING ALCOHOL: NEVER

## 2024-05-19 ASSESSMENT — PAIN SCALES - GENERAL: PAINLEVEL_OUTOF10: 10

## 2024-05-19 ASSESSMENT — PAIN DESCRIPTION - ORIENTATION: ORIENTATION: RIGHT

## 2024-05-20 ENCOUNTER — APPOINTMENT (OUTPATIENT)
Dept: GENERAL RADIOLOGY | Age: 19
End: 2024-05-20
Payer: COMMERCIAL

## 2024-05-20 ENCOUNTER — OFFICE VISIT (OUTPATIENT)
Dept: ORTHOPEDIC SURGERY | Facility: CLINIC | Age: 19
End: 2024-05-20
Payer: COMMERCIAL

## 2024-05-20 VITALS
DIASTOLIC BLOOD PRESSURE: 89 MMHG | HEIGHT: 70 IN | BODY MASS INDEX: 24.34 KG/M2 | WEIGHT: 170 LBS | OXYGEN SATURATION: 97 % | RESPIRATION RATE: 18 BRPM | TEMPERATURE: 97.9 F | SYSTOLIC BLOOD PRESSURE: 139 MMHG | HEART RATE: 75 BPM

## 2024-05-20 DIAGNOSIS — S43.431D TYPE I SLAP LESION, RIGHT, SUBSEQUENT ENCOUNTER: Primary | ICD-10-CM

## 2024-05-20 PROCEDURE — 99214 OFFICE O/P EST MOD 30 MIN: CPT | Performed by: ORTHOPAEDIC SURGERY

## 2024-05-20 PROCEDURE — 3008F BODY MASS INDEX DOCD: CPT | Performed by: ORTHOPAEDIC SURGERY

## 2024-05-20 PROCEDURE — 73020 X-RAY EXAM OF SHOULDER: CPT

## 2024-05-20 PROCEDURE — 2500000003 HC RX 250 WO HCPCS: Performed by: EMERGENCY MEDICINE

## 2024-05-20 RX ORDER — ETOMIDATE 2 MG/ML
20 INJECTION INTRAVENOUS ONCE
Status: COMPLETED | OUTPATIENT
Start: 2024-05-20 | End: 2024-05-20

## 2024-05-20 RX ADMIN — ETOMIDATE INJECTION 20 MG: 2 SOLUTION INTRAVENOUS at 00:43

## 2024-05-20 ASSESSMENT — PAIN DESCRIPTION - ORIENTATION: ORIENTATION: RIGHT

## 2024-05-20 ASSESSMENT — PAIN SCALES - GENERAL
PAINLEVEL_OUTOF10: 4
PAINLEVEL_OUTOF10: 5
PAINLEVEL_OUTOF10: 7

## 2024-05-20 ASSESSMENT — ENCOUNTER SYMPTOMS
VOMITING: 0
ABDOMINAL PAIN: 0
PHOTOPHOBIA: 0
ABDOMINAL DISTENTION: 0
EYE DISCHARGE: 0
SORE THROAT: 0
WHEEZING: 0
CHEST TIGHTNESS: 0
SHORTNESS OF BREATH: 0
COUGH: 0

## 2024-05-20 ASSESSMENT — PAIN DESCRIPTION - LOCATION: LOCATION: SHOULDER

## 2024-05-20 NOTE — LETTER
[facility]  [address] [place] [address] [place] [id number]  PHONE: [phone]  FAX: [phone]      May 20, 2024    Patient: Merritt Brown   YOB: 2005   Date of Visit: 5/20/2024       To Whom It May Concern,    Merritt Brown , is under the care of Dr Booker Lacey. Please excuse  Merritt from work he has a doctors appointment today.    If you have any questions or concerns, please contact the office by phone or fax.  Phone: [phone] or Fax: [phone]        Sincerely,      Booker Lacey MD

## 2024-05-20 NOTE — PROGRESS NOTES
History of Present Illness   Chief Complaint   Patient presents with    Right Shoulder - Follow-up     Labral repair/bankart repair  Dislocation 5/19 [facility] xrays         History of Present Illness:  Merritt Brown is a 18 y.o. male patient who presents for recurrent dislocation while wrestling with his friends.  Patient is overall his shoulder is been stable and relatively pain-free.  This is a very unique instance where he was unable to protect himself and dislocated.  Underwent a closed reduction in the ER at [facility] he states his pain is way better.  Imaging:  Shoulder: Right shoulder [facility]: Demonstrates dislocated shoulder.  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  1.  Schedule MRI anthrogram.  Discussed the family overall and concerned he may have retorn his labrum given his recurrent instability event.  He had been limited weightbearing on the right upper extremity 5 to pounds maximum to follow-up  2.  NSAID: Ibuprofen.  GI side effects and medical risks discussed  3.  Ice: 30 minutes on and off  4.  Exercise home program: Medically directed Shoulder therapy / Handout given  5.  Follow-up after MR arthrogram     Physical Exam:  Well-nourished, well-developed. No acute distress. Alert and oriented x3. Responds appropriately to questioning. Good mood. Normal affect.  Physical Exam  Right shoulder:  Strength / ROM: 5 out of 5 rotator cuff strength.  Moderate apprehension.  +2 anterior quadrant glide with pain.  Positive jerk test  Speeds test + impingement  Positive Neer and Hawkin´s test  Neurovascular exam normal distally     Review of Systems:  GENERAL: Negative for malaise, significant weight loss, fever  MUSCULOSKELETAL: See HPI  NEURO:  Negative     Past Medical History:   Diagnosis  Date    Achilles tendinitis, unspecified leg 03/21/2018    Achilles tendinitis    Acute upper respiratory infection, unspecified 04/11/2019    Acute upper respiratory infection    Allergic contact dermatitis due to plants, except food 08/05/2015    Contact dermatitis due to poison ivy    Body mass index (BMI) pediatric, 5th percentile to less than 85th percentile for age 10/16/2019    BMI (body mass index), pediatric, 5% to less than 85% for age    Encounter for immunization 11/20/2020    Encounter for administration of vaccine    Encounter for routine child health examination without abnormal findings 10/17/2019    Encounter for routine child health examination without abnormal findings    Encounter for routine child health examination without abnormal findings 11/17/2017    Encounter for routine child health examination w/o abnormal findings    Hypoglycemia 10/04/2023    Iliotibial band syndrome, unspecified leg 03/17/2017    Iliotibial band syndrome    Immunization not carried out because of patient decision for unspecified reason 10/16/2019    Immunization not carried out because of patient decision    Other acute nonsuppurative otitis media, unspecified ear 08/05/2015    Acute nonsuppurative otitis media    Other conditions influencing health status 02/22/2017    History of cough    Other infective otitis externa, unspecified ear 07/29/2013    Infective otitis externa    Otitis media, unspecified, left ear 04/11/2019    Acute left otitis media    Otitis media, unspecified, unspecified ear 10/16/2019    Recurrent acute otitis media    Overweight 11/10/2017    Overweight, pediatric, BMI 85.0-94.9 percentile for age    Pain in left hip 03/17/2017    Left hip pain in pediatric patient    Pain in unspecified ankle and joints of unspecified foot 03/21/2018    Ankle pain    Personal history of diseases of the skin and subcutaneous tissue 11/18/2021    History of cellulitis    Personal history of diseases of the skin  and subcutaneous tissue 09/21/2016    History of impetigo    Personal history of diseases of the skin and subcutaneous tissue 07/24/2015    History of sunburn    Personal history of other diseases of the nervous system and sense organs 02/22/2017    History of acute otitis media    Personal history of other diseases of the nervous system and sense organs 07/29/2013    History of acute otitis media    Personal history of other diseases of the respiratory system 02/22/2017    History of acute pharyngitis    Personal history of other diseases of the respiratory system 11/03/2021    History of acute pharyngitis    Personal history of other diseases of the respiratory system 02/18/2014    History of upper respiratory infection    Personal history of other drug therapy 11/10/2017    History of influenza vaccination    Personal history of other infectious and parasitic diseases 09/21/2016    History of viral exanthem    Rash and other nonspecific skin eruption 09/21/2016    Rash    Unspecified hearing loss, unspecified ear 11/03/2014    Hearing difficulty    Unspecified injury of left foot, initial encounter 02/19/2018    Injury of small toe, left, initial encounter    Unspecified injury of unspecified ankle, initial encounter 03/21/2018    Ankle injury       Medication Documentation Review Audit       Reviewed by MAVIS Chadwick (Nurse Practitioner) on 03/07/24 at 1543      Medication Order Taking? Sig Documenting Provider Last Dose Status   carbamide peroxide (Debrox) 6.5 % otic solution [id number]  Administer into affected ear(s). Historical Provider, MD  Active   ibuprofen 800 mg tablet [id number]  Take 1 tablet (800 mg) by mouth every 8 hours if needed for mild pain (1 - 3). Booker Lacey MD  Active   methylPREDNISolone (Medrol Dospak) 4 mg tablets [id number]  Use as directed by package instructions Booker Lacey MD  Active   multivitamin tablet [id number]  Take by mouth. Historical Provider, MD  Active    oxyCODONE-acetaminophen (Percocet) 5-325 mg tablet [id number]  Take 1 tablet by mouth every 6 hours if needed. Historical Provider, MD  Active   pediatric multivitamin tablet chewable split tablet [id number]  Take by mouth. Historical Provider, MD  Active                    No Known Allergies    Social History     Socioeconomic History    Marital status: Single     Spouse name: Not on file    Number of children: Not on file    Years of education: Not on file    Highest education level: Not on file   Occupational History    Not on file   Tobacco Use    Smoking status: Never     Passive exposure: Never    Smokeless tobacco: Never   Vaping Use    Vaping status: Never Used   Substance and Sexual Activity    Alcohol use: Not on file    Drug use: Not on file    Sexual activity: Not Currently   Other Topics Concern    Not on file   Social History Narrative    Not on file     Social Determinants of Health     Financial Resource Strain: Not on file   Food Insecurity: Not on file   Transportation Needs: Not on file   Physical Activity: Not on file   Stress: Not on file   Social Connections: Not on file   Intimate Partner Violence: Not on file   Housing Stability: Not on file       Past Surgical History:   Procedure Laterality Date    TONSILLECTOMY  04/24/2013    Tonsillectomy With Adenoidectomy    TYMPANOSTOMY TUBE PLACEMENT  04/24/2013    Ear Pressure Equalization Tube       No results found.                           Scribe Attestation  By signing my name below, IVioletta Scribe   attest that this documentation has been prepared under the direction and in the presence of Booker Lacey MD.

## 2024-05-20 NOTE — ED PROVIDER NOTES
[facility] ED  eMERGENCY dEPARTMENT eNCOUnter      Pt Name: Matthew Naranjo  MRN: [id number]  Birthdate 2005  Date of evaluation: 5/19/2024  Provider: Iggy Tenorio MD    CHIEF COMPLAINT       Chief Complaint   Patient presents with    Shoulder Injury     Pt fell off skate board. Pt landed on his R. Shoulder increased pain unable to move shoulder onset 2300         HISTORY OF PRESENT ILLNESS   (Location/Symptom, Timing/Onset,Context/Setting, Quality, Duration, Modifying Factors, Severity)  Note limiting factors.   Matthew Naranjo is a 18 y.o. male who presents to the emergency department for evaluation of right shoulder pain possible dislocation.  Patient was skateboarding tonight and injured his right shoulder.  He feels it is dislocated.  Patient's had prior injury prior dislocation to the shoulder.  He has had a labrum tear that has not been operated on.  Current pain level is moderate.  Worse with movement.  No numbness or paresthesias.  No other injuries such as head or neck injury    HPI    NursingNotes were reviewed.    REVIEW OF SYSTEMS    (2-9 systems for level 4, 10 or more for level 5)     Review of Systems   Constitutional:  Negative for chills and diaphoresis.   HENT:  Negative for congestion, ear pain, mouth sores and sore throat.    Eyes:  Negative for photophobia and discharge.   Respiratory:  Negative for cough, chest tightness, shortness of breath and wheezing.    Cardiovascular:  Negative for chest pain and palpitations.   Gastrointestinal:  Negative for abdominal distention, abdominal pain and vomiting.   Endocrine: Negative for cold intolerance.   Genitourinary:  Negative for difficulty urinating.   Musculoskeletal:  Negative for arthralgias.   Skin:  Negative for pallor and rash.   Allergic/Immunologic: Negative for immunocompromised state.   Neurological:  Negative for dizziness and syncope.   Hematological:  Negative for adenopathy.   Psychiatric/Behavioral:

## 2024-05-22 ENCOUNTER — HOSPITAL ENCOUNTER (OUTPATIENT)
Dept: RADIOLOGY | Facility: CLINIC | Age: 19
Discharge: HOME | End: 2024-05-22
Payer: COMMERCIAL

## 2024-05-22 ENCOUNTER — OFFICE VISIT (OUTPATIENT)
Dept: ORTHOPEDIC SURGERY | Facility: CLINIC | Age: 19
End: 2024-05-22
Payer: COMMERCIAL

## 2024-05-22 DIAGNOSIS — S43.431D TYPE I SLAP LESION, RIGHT, SUBSEQUENT ENCOUNTER: ICD-10-CM

## 2024-05-22 DIAGNOSIS — M25.511 RIGHT SHOULDER PAIN, UNSPECIFIED CHRONICITY: ICD-10-CM

## 2024-05-22 DIAGNOSIS — S43.431D TYPE I SLAP LESION, RIGHT, SUBSEQUENT ENCOUNTER: Primary | ICD-10-CM

## 2024-05-22 PROCEDURE — 73030 X-RAY EXAM OF SHOULDER: CPT | Mod: RT

## 2024-05-22 PROCEDURE — 3008F BODY MASS INDEX DOCD: CPT | Performed by: INTERNAL MEDICINE

## 2024-05-22 PROCEDURE — 99213 OFFICE O/P EST LOW 20 MIN: CPT | Performed by: INTERNAL MEDICINE

## 2024-05-22 PROCEDURE — 73030 X-RAY EXAM OF SHOULDER: CPT | Mod: RIGHT SIDE | Performed by: INTERNAL MEDICINE

## 2024-05-22 NOTE — PROGRESS NOTES
Acute Injury New Patient Visit    CC: No chief complaint on file.      HPI: Merritt is a 18 y.o. male presents today for evaluation for acute right shoulder injury sustained while wrestling three days ago. He went to the ER where x-rays were taken and had a right shoulder dislocation reduced. He states that his MRI has been approved. He is here for concerns that his shoulder dislocated again.  He is Dr Lacey's patient.        Review of Systems   GENERAL: Negative for malaise, significant weight loss, fever  MUSCULOSKELETAL: See HPI  NEURO:  Negative for numbness / tingling     Past Medical History  Past Medical History:   Diagnosis Date    Achilles tendinitis, unspecified leg 03/21/2018    Achilles tendinitis    Acute upper respiratory infection, unspecified 04/11/2019    Acute upper respiratory infection    Allergic contact dermatitis due to plants, except food 08/05/2015    Contact dermatitis due to poison ivy    Body mass index (BMI) pediatric, 5th percentile to less than 85th percentile for age 10/16/2019    BMI (body mass index), pediatric, 5% to less than 85% for age    Encounter for immunization 11/20/2020    Encounter for administration of vaccine    Encounter for routine child health examination without abnormal findings 10/17/2019    Encounter for routine child health examination without abnormal findings    Encounter for routine child health examination without abnormal findings 11/17/2017    Encounter for routine child health examination w/o abnormal findings    Hypoglycemia 10/04/2023    Iliotibial band syndrome, unspecified leg 03/17/2017    Iliotibial band syndrome    Immunization not carried out because of patient decision for unspecified reason 10/16/2019    Immunization not carried out because of patient decision    Other acute nonsuppurative otitis media, unspecified ear 08/05/2015    Acute nonsuppurative otitis media    Other conditions influencing health status 02/22/2017    History of  cough    Other infective otitis externa, unspecified ear 07/29/2013    Infective otitis externa    Otitis media, unspecified, left ear 04/11/2019    Acute left otitis media    Otitis media, unspecified, unspecified ear 10/16/2019    Recurrent acute otitis media    Overweight 11/10/2017    Overweight, pediatric, BMI 85.0-94.9 percentile for age    Pain in left hip 03/17/2017    Left hip pain in pediatric patient    Pain in unspecified ankle and joints of unspecified foot 03/21/2018    Ankle pain    Personal history of diseases of the skin and subcutaneous tissue 11/18/2021    History of cellulitis    Personal history of diseases of the skin and subcutaneous tissue 09/21/2016    History of impetigo    Personal history of diseases of the skin and subcutaneous tissue 07/24/2015    History of sunburn    Personal history of other diseases of the nervous system and sense organs 02/22/2017    History of acute otitis media    Personal history of other diseases of the nervous system and sense organs 07/29/2013    History of acute otitis media    Personal history of other diseases of the respiratory system 02/22/2017    History of acute pharyngitis    Personal history of other diseases of the respiratory system 11/03/2021    History of acute pharyngitis    Personal history of other diseases of the respiratory system 02/18/2014    History of upper respiratory infection    Personal history of other drug therapy 11/10/2017    History of influenza vaccination    Personal history of other infectious and parasitic diseases 09/21/2016    History of viral exanthem    Rash and other nonspecific skin eruption 09/21/2016    Rash    Unspecified hearing loss, unspecified ear 11/03/2014    Hearing difficulty    Unspecified injury of left foot, initial encounter 02/19/2018    Injury of small toe, left, initial encounter    Unspecified injury of unspecified ankle, initial encounter 03/21/2018    Ankle injury       Medication  review  Medication Documentation Review Audit       Reviewed by MAVIS Chadwick (Nurse Practitioner) on 03/07/24 at 1543      Medication Order Taking? Sig Documenting Provider Last Dose Status   carbamide peroxide (Debrox) 6.5 % otic solution [id number]  Administer into affected ear(s). Historical Provider, MD  Active   ibuprofen 800 mg tablet [id number]  Take 1 tablet (800 mg) by mouth every 8 hours if needed for mild pain (1 - 3). Booker Lacey MD  Active   methylPREDNISolone (Medrol Dospak) 4 mg tablets [id number]  Use as directed by package instructions Booker Lacey MD  Active   multivitamin tablet [id number]  Take by mouth. Historical Provider, MD  Active   oxyCODONE-acetaminophen (Percocet) 5-325 mg tablet [id number]  Take 1 tablet by mouth every 6 hours if needed. Historical Provider, MD  Active   pediatric multivitamin tablet chewable split tablet [id number]  Take by mouth. Historical Provider, MD  Active                    Allergies  No Known Allergies    Social History  Social History     Socioeconomic History    Marital status: Single     Spouse name: Not on file    Number of children: Not on file    Years of education: Not on file    Highest education level: Not on file   Occupational History    Not on file   Tobacco Use    Smoking status: Never     Passive exposure: Never    Smokeless tobacco: Never   Vaping Use    Vaping status: Never Used   Substance and Sexual Activity    Alcohol use: Not on file    Drug use: Not on file    Sexual activity: Not Currently   Other Topics Concern    Not on file   Social History Narrative    Not on file     Social Determinants of Health     Financial Resource Strain: Not on file   Food Insecurity: Not on file   Transportation Needs: Not on file   Physical Activity: Not on file   Stress: Not on file   Social Connections: Not on file   Intimate Partner Violence: Not on file   Housing Stability: Not on file       Surgical History  Past Surgical History:   Procedure  Laterality Date    TONSILLECTOMY  04/24/2013    Tonsillectomy With Adenoidectomy    TYMPANOSTOMY TUBE PLACEMENT  04/24/2013    Ear Pressure Equalization Tube       Physical Exam:  GENERAL:  Patient is awake, alert, and oriented to person place and time.  Patient appears well nourished and well kept.  Affect Calm, Not Acutely Distressed.  HEENT:  Normocephalic, Atraumatic, EOMI  CARDIOVASCULAR:  Hemodynamically stable.  RESPIRATORY:  Normal respirations with unlabored breathing.  Extremity: Right shoulder shows skin is intact.  There is no erythema or warmth.  There is no clinical sign infection.  No obvious deformity.    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  X-ray today shows no acute dislocation, there are findings of the questionable loose body versus possible bony Bankart fragment, recommended MRI arthrogram which was approved. He will follow-up with Dr Lacey after the MRI arthrogram.    No orders of the defined types were placed in this encounter.     At the conclusion of the visit there were no further questions by the patient/family regarding their plan of care.  Patient was instructed to call or return with any issues, questions, or concerns regarding their injury and/or treatment plan described above.     05/22/24 at 12:42 PM - Capri Pedro MD  Scribe Attestation  By signing my name below, I, Nikko Fagan, Scribe   attest that this documentation has been prepared under the direction and in the presence of Capri Pedro MD.    Office: [phone]    This note was prepared using voice recognition software.  The details of this note are correct and have been reviewed, and corrected to the best of my ability.  Some grammatical errors may persist related to the Dragon software.

## 2024-05-28 ENCOUNTER — HOSPITAL ENCOUNTER (OUTPATIENT)
Dept: RADIOLOGY | Facility: HOSPITAL | Age: 19
Discharge: HOME | End: 2024-05-28
Payer: COMMERCIAL

## 2024-05-28 DIAGNOSIS — S43.431D TYPE I SLAP LESION, RIGHT, SUBSEQUENT ENCOUNTER: ICD-10-CM

## 2024-05-28 PROCEDURE — 2500000005 HC RX 250 GENERAL PHARMACY W/O HCPCS: Performed by: ORTHOPAEDIC SURGERY

## 2024-05-28 PROCEDURE — 73222 MRI JOINT UPR EXTREM W/DYE: CPT | Mod: RIGHT SIDE | Performed by: STUDENT IN AN ORGANIZED HEALTH CARE EDUCATION/TRAINING PROGRAM

## 2024-05-28 PROCEDURE — 73040 CONTRAST X-RAY OF SHOULDER: CPT | Mod: RT

## 2024-05-28 PROCEDURE — 23350 INJECTION FOR SHOULDER X-RAY: CPT | Mod: RIGHT SIDE | Performed by: RADIOLOGY

## 2024-05-28 PROCEDURE — 77002 NEEDLE LOCALIZATION BY XRAY: CPT | Mod: RIGHT SIDE | Performed by: RADIOLOGY

## 2024-05-28 PROCEDURE — 73222 MRI JOINT UPR EXTREM W/DYE: CPT | Mod: RT

## 2024-05-28 PROCEDURE — 2550000001 HC RX 255 CONTRASTS: Performed by: ORTHOPAEDIC SURGERY

## 2024-05-28 PROCEDURE — A9575 INJ GADOTERATE MEGLUMI 0.1ML: HCPCS | Performed by: ORTHOPAEDIC SURGERY

## 2024-05-28 RX ORDER — SODIUM CHLORIDE 9 MG/ML
INJECTION INTRAMUSCULAR; INTRAVENOUS; SUBCUTANEOUS AS NEEDED
Status: COMPLETED | OUTPATIENT
Start: 2024-05-28 | End: 2024-05-28

## 2024-05-28 RX ORDER — GADOTERATE MEGLUMINE 376.9 MG/ML
0.1 INJECTION INTRAVENOUS
Status: COMPLETED | OUTPATIENT
Start: 2024-05-28 | End: 2024-05-28

## 2024-05-28 RX ORDER — LIDOCAINE HYDROCHLORIDE 20 MG/ML
INJECTION, SOLUTION EPIDURAL; INFILTRATION; INTRACAUDAL; PERINEURAL AS NEEDED
Status: COMPLETED | OUTPATIENT
Start: 2024-05-28 | End: 2024-05-28

## 2024-05-28 RX ADMIN — GADOTERATE MEGLUMINE 0.1 ML: 376.9 INJECTION INTRAVENOUS at 13:48

## 2024-05-28 RX ADMIN — SODIUM CHLORIDE 11 ML: 9 INJECTION, SOLUTION INTRAMUSCULAR; INTRAVENOUS; SUBCUTANEOUS at 13:45

## 2024-05-28 RX ADMIN — LIDOCAINE HYDROCHLORIDE 8 ML: 20 INJECTION, SOLUTION EPIDURAL; INFILTRATION; INTRACAUDAL; PERINEURAL at 13:44

## 2024-05-28 RX ADMIN — IOHEXOL 1 ML: 300 INJECTION, SOLUTION INTRAVENOUS at 13:48

## 2024-06-03 ENCOUNTER — OFFICE VISIT (OUTPATIENT)
Dept: ORTHOPEDIC SURGERY | Facility: CLINIC | Age: 19
End: 2024-06-03
Payer: COMMERCIAL

## 2024-06-03 DIAGNOSIS — Z98.890 STATUS POST LABRAL REPAIR OF SHOULDER: ICD-10-CM

## 2024-06-03 DIAGNOSIS — S43.431A LABRAL TEAR OF SHOULDER, RIGHT, INITIAL ENCOUNTER: Primary | ICD-10-CM

## 2024-06-03 DIAGNOSIS — S43.431A SUPERIOR GLENOID LABRUM LESION OF RIGHT SHOULDER, INITIAL ENCOUNTER: ICD-10-CM

## 2024-06-03 DIAGNOSIS — R79.89 LOW TESTOSTERONE IN MALE: ICD-10-CM

## 2024-06-03 DIAGNOSIS — Z91.89 AT RISK FOR SIDE EFFECT OF MEDICATION: ICD-10-CM

## 2024-06-03 DIAGNOSIS — Z78.9 TAKES DIETARY SUPPLEMENTS: ICD-10-CM

## 2024-06-03 PROCEDURE — 1036F TOBACCO NON-USER: CPT | Performed by: ORTHOPAEDIC SURGERY

## 2024-06-03 PROCEDURE — 99214 OFFICE O/P EST MOD 30 MIN: CPT | Performed by: ORTHOPAEDIC SURGERY

## 2024-06-03 PROCEDURE — 3008F BODY MASS INDEX DOCD: CPT | Performed by: ORTHOPAEDIC SURGERY

## 2024-06-03 NOTE — H&P (VIEW-ONLY)
Chief Complaint   Patient presents with    Right Shoulder - Follow-up     Labral repair/bankart repair  Dislocation 5/19 [facility] xrays  MRI review from 05/28/2024       History of Present Illness:  DOS: 07/05/23 right shoulder arthroscopy anterior labral repair/Bankart repair. He was seen by Dr. Pedro on 05/22/24 with concerns he dislocated his shoulder again. He underwent an MRI arthrogram of his right shoulder on 05/28/24. He denies any current pain with this injury but his shoulder in not functional right now. He states that he uses his shoulder a lot at work.     He had a recurrent dislocation while wrestling with his friends. This is a very unique instance where he was unable to protect himself and dislocated.  Underwent a closed reduction in the ER at [facility] he states his pain is way better.  But he is basically not using the arm for daily function    Imaging:  Shoulder: Right shoulder [facility]: Demonstrates dislocated shoulder.  Postreduction x-rays show concentric glenohumeral joint    MR Arthrogram: Shows recurrent anterior labral tear and notable bony Bankart compared to prior.     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  1.  Discussed with the family overall and concerned he may have retorn his labrum given his recurrent instability event. Plan for a revision labral tear/Bankart repair.    2.  NSAID: Ibuprofen.  GI side effects and medical risks discussed  3.  Ice: 60 minutes on and off  4.  Exercise home program: Medically directed Shoulder therapy / Handout given    Right shoulder arthroscopic Bankart repair revision  Risks benefits and alternatives to surgery were discussed including but not limited to Infection, bleeding, neurovascular injury, pain and dysfunction, hardware related complications including cutout failure breakage, loss of  function, motion, and permanent disability as well as the cardiovascular and pulmonary complications from anesthesia including death and DVT. Patient and family accept these risks.  We discussed specifically hardware related complications including anchor cut out, failure, breakage, recurrent instability, posttraumatic arthritis, loss in strength, function or motion and the possible need for revision surgeries.  We also discussed the family increased technical demands in regards to his revision surgery and high risk associated complications.    Plan for outpatient surgery   1. 1 week postop follow up with 3view x-rays.  2. Percocet for postop pain relief. OARRS has been reviewed and is consistent with prescribed medications. This report is scanned into the electronic medical record. The risks of abuse, dependence, addiction and diversion were considered. The medication is felt to be clinically appropriate based on documented diagnosis .  3.  Pre-CERT for removal sling postop     Physical Exam:  Well-nourished, well-developed. No acute distress. Alert and oriented x3. Responds appropriately to questioning. Good mood. Normal affect.  Physical Exam  Right shoulder:  Strength / ROM: 5 out of 5 rotator cuff strength.    Moderate apprehension.    +2 anterior quadrant glide with pain.    Positive jerk test  Speeds test + impingement  Positive Neer and Hawkin´s test  Neurovascular exam normal distally     Review of Systems:  GENERAL: Negative for malaise, significant weight loss, fever  MUSCULOSKELETAL: See HPI  NEURO:  Negative     Past Medical History:   Diagnosis Date    Achilles tendinitis, unspecified leg 03/21/2018    Achilles tendinitis    Acute upper respiratory infection, unspecified 04/11/2019    Acute upper respiratory infection    Allergic contact dermatitis due to plants, except food 08/05/2015    Contact dermatitis due to poison ivy    Body mass index (BMI) pediatric, 5th percentile to less than 85th  percentile for age 10/16/2019    BMI (body mass index), pediatric, 5% to less than 85% for age    Encounter for immunization 11/20/2020    Encounter for administration of vaccine    Encounter for routine child health examination without abnormal findings 10/17/2019    Encounter for routine child health examination without abnormal findings    Encounter for routine child health examination without abnormal findings 11/17/2017    Encounter for routine child health examination w/o abnormal findings    Hypoglycemia 10/04/2023    Iliotibial band syndrome, unspecified leg 03/17/2017    Iliotibial band syndrome    Immunization not carried out because of patient decision for unspecified reason 10/16/2019    Immunization not carried out because of patient decision    Other acute nonsuppurative otitis media, unspecified ear 08/05/2015    Acute nonsuppurative otitis media    Other conditions influencing health status 02/22/2017    History of cough    Other infective otitis externa, unspecified ear 07/29/2013    Infective otitis externa    Otitis media, unspecified, left ear 04/11/2019    Acute left otitis media    Otitis media, unspecified, unspecified ear 10/16/2019    Recurrent acute otitis media    Overweight 11/10/2017    Overweight, pediatric, BMI 85.0-94.9 percentile for age    Pain in left hip 03/17/2017    Left hip pain in pediatric patient    Pain in unspecified ankle and joints of unspecified foot 03/21/2018    Ankle pain    Personal history of diseases of the skin and subcutaneous tissue 11/18/2021    History of cellulitis    Personal history of diseases of the skin and subcutaneous tissue 09/21/2016    History of impetigo    Personal history of diseases of the skin and subcutaneous tissue 07/24/2015    History of sunburn    Personal history of other diseases of the nervous system and sense organs 02/22/2017    History of acute otitis media    Personal history of other diseases of the nervous system and sense organs  07/29/2013    History of acute otitis media    Personal history of other diseases of the respiratory system 02/22/2017    History of acute pharyngitis    Personal history of other diseases of the respiratory system 11/03/2021    History of acute pharyngitis    Personal history of other diseases of the respiratory system 02/18/2014    History of upper respiratory infection    Personal history of other drug therapy 11/10/2017    History of influenza vaccination    Personal history of other infectious and parasitic diseases 09/21/2016    History of viral exanthem    Rash and other nonspecific skin eruption 09/21/2016    Rash    Unspecified hearing loss, unspecified ear 11/03/2014    Hearing difficulty    Unspecified injury of left foot, initial encounter 02/19/2018    Injury of small toe, left, initial encounter    Unspecified injury of unspecified ankle, initial encounter 03/21/2018    Ankle injury       Medication Documentation Review Audit       Reviewed by Tiffanie Tai MA (Medical Assistant) on 06/03/24 at 1557      Medication Order Taking? Sig Documenting Provider Last Dose Status   carbamide peroxide (Debrox) 6.5 % otic solution [id number]  Administer into affected ear(s). Historical Provider, MD  Active   methylPREDNISolone (Medrol Dospak) 4 mg tablets [id number]  Use as directed by package instructions Booker Lacey MD  Active   multivitamin tablet [id number]  Take by mouth. Historical MD Antonia  Active   oxyCODONE-acetaminophen (Percocet) 5-325 mg tablet [id number]  Take 1 tablet by mouth every 6 hours if needed. Historical Provider, MD  Active   pediatric multivitamin tablet chewable split tablet [id number]  Take by mouth. Historical Provider, MD  Active                    No Known Allergies    Social History     Socioeconomic History    Marital status: Single     Spouse name: Not on file    Number of children: Not on file    Years of education: Not on file    Highest education level: Not on file    Occupational History    Not on file   Tobacco Use    Smoking status: Never     Passive exposure: Never    Smokeless tobacco: Never   Vaping Use    Vaping status: Never Used   Substance and Sexual Activity    Alcohol use: Not on file    Drug use: Not on file    Sexual activity: Not Currently   Other Topics Concern    Not on file   Social History Narrative    Not on file     Social Determinants of Health     Financial Resource Strain: Not on file   Food Insecurity: Not on file   Transportation Needs: Not on file   Physical Activity: Not on file   Stress: Not on file   Social Connections: Not on file   Intimate Partner Violence: Not on file   Housing Stability: Not on file       Past Surgical History:   Procedure Laterality Date    TONSILLECTOMY  04/24/2013    Tonsillectomy With Adenoidectomy    TYMPANOSTOMY TUBE PLACEMENT  04/24/2013    Ear Pressure Equalization Tube       No results found.                           Scribe Attestation  By signing my name below, Breanna DASILVA Scribe   attest that this documentation has been prepared under the direction and in the presence of Booker Lacey MD.

## 2024-06-03 NOTE — PROGRESS NOTES
Chief Complaint   Patient presents with    Right Shoulder - Follow-up     Labral repair/bankart repair  Dislocation 5/19 [facility] xrays  MRI review from 05/28/2024       History of Present Illness:  DOS: 07/05/23 right shoulder arthroscopy anterior labral repair/Bankart repair. He was seen by Dr. Pedro on 05/22/24 with concerns he dislocated his shoulder again. He underwent an MRI arthrogram of his right shoulder on 05/28/24. He denies any current pain with this injury but his shoulder in not functional right now. He states that he uses his shoulder a lot at work.     He had a recurrent dislocation while wrestling with his friends. This is a very unique instance where he was unable to protect himself and dislocated.  Underwent a closed reduction in the ER at [facility] he states his pain is way better.  But he is basically not using the arm for daily function    Imaging:  Shoulder: Right shoulder [facility]: Demonstrates dislocated shoulder.  Postreduction x-rays show concentric glenohumeral joint    MR Arthrogram: Shows recurrent anterior labral tear and notable bony Bankart compared to prior.     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  1.  Discussed with the family overall and concerned he may have retorn his labrum given his recurrent instability event. Plan for a revision labral tear/Bankart repair.    2.  NSAID: Ibuprofen.  GI side effects and medical risks discussed  3.  Ice: 60 minutes on and off  4.  Exercise home program: Medically directed Shoulder therapy / Handout given    Right shoulder arthroscopic Bankart repair revision  Risks benefits and alternatives to surgery were discussed including but not limited to Infection, bleeding, neurovascular injury, pain and dysfunction, hardware related complications including cutout failure breakage, loss of  function, motion, and permanent disability as well as the cardiovascular and pulmonary complications from anesthesia including death and DVT. Patient and family accept these risks.  We discussed specifically hardware related complications including anchor cut out, failure, breakage, recurrent instability, posttraumatic arthritis, loss in strength, function or motion and the possible need for revision surgeries.  We also discussed the family increased technical demands in regards to his revision surgery and high risk associated complications.    Plan for outpatient surgery   1. 1 week postop follow up with [facility]view x-rays.  2. Percocet for postop pain relief. OARRS has been reviewed and is consistent with prescribed medications. This report is scanned into the electronic medical record. The risks of abuse, dependence, addiction and diversion were considered. The medication is felt to be clinically appropriate based on documented diagnosis .  3.  Pre-CERT for removal sling postop     Physical Exam:  Well-nourished, well-developed. No acute distress. Alert and oriented x3. Responds appropriately to questioning. Good mood. Normal affect.  Physical Exam  Right shoulder:  Strength / ROM: 5 out of 5 rotator cuff strength.    Moderate apprehension.    +2 anterior quadrant glide with pain.    Positive jerk test  Speeds test + impingement  Positive Neer and Hawkin´s test  Neurovascular exam normal distally     Review of Systems:  GENERAL: Negative for malaise, significant weight loss, fever  MUSCULOSKELETAL: See HPI  NEURO:  Negative     Past Medical History:   Diagnosis Date    Achilles tendinitis, unspecified leg 03/21/2018    Achilles tendinitis    Acute upper respiratory infection, unspecified 04/11/2019    Acute upper respiratory infection    Allergic contact dermatitis due to plants, except food 08/05/2015    Contact dermatitis due to poison ivy    Body mass index (BMI) pediatric, 5th percentile to less than 85th  percentile for age 10/16/2019    BMI (body mass index), pediatric, 5% to less than 85% for age    Encounter for immunization 11/20/2020    Encounter for administration of vaccine    Encounter for routine child health examination without abnormal findings 10/17/2019    Encounter for routine child health examination without abnormal findings    Encounter for routine child health examination without abnormal findings 11/17/2017    Encounter for routine child health examination w/o abnormal findings    Hypoglycemia 10/04/2023    Iliotibial band syndrome, unspecified leg 03/17/2017    Iliotibial band syndrome    Immunization not carried out because of patient decision for unspecified reason 10/16/2019    Immunization not carried out because of patient decision    Other acute nonsuppurative otitis media, unspecified ear 08/05/2015    Acute nonsuppurative otitis media    Other conditions influencing health status 02/22/2017    History of cough    Other infective otitis externa, unspecified ear 07/29/2013    Infective otitis externa    Otitis media, unspecified, left ear 04/11/2019    Acute left otitis media    Otitis media, unspecified, unspecified ear 10/16/2019    Recurrent acute otitis media    Overweight 11/10/2017    Overweight, pediatric, BMI 85.0-94.9 percentile for age    Pain in left hip 03/17/2017    Left hip pain in pediatric patient    Pain in unspecified ankle and joints of unspecified foot 03/21/2018    Ankle pain    Personal history of diseases of the skin and subcutaneous tissue 11/18/2021    History of cellulitis    Personal history of diseases of the skin and subcutaneous tissue 09/21/2016    History of impetigo    Personal history of diseases of the skin and subcutaneous tissue 07/24/2015    History of sunburn    Personal history of other diseases of the nervous system and sense organs 02/22/2017    History of acute otitis media    Personal history of other diseases of the nervous system and sense organs  07/29/2013    History of acute otitis media    Personal history of other diseases of the respiratory system 02/22/2017    History of acute pharyngitis    Personal history of other diseases of the respiratory system 11/03/2021    History of acute pharyngitis    Personal history of other diseases of the respiratory system 02/18/2014    History of upper respiratory infection    Personal history of other drug therapy 11/10/2017    History of influenza vaccination    Personal history of other infectious and parasitic diseases 09/21/2016    History of viral exanthem    Rash and other nonspecific skin eruption 09/21/2016    Rash    Unspecified hearing loss, unspecified ear 11/03/2014    Hearing difficulty    Unspecified injury of left foot, initial encounter 02/19/2018    Injury of small toe, left, initial encounter    Unspecified injury of unspecified ankle, initial encounter 03/21/2018    Ankle injury       Medication Documentation Review Audit       Reviewed by Tiffanie Tai MA (Medical Assistant) on 06/03/24 at 1557      Medication Order Taking? Sig Documenting Provider Last Dose Status   carbamide peroxide (Debrox) 6.5 % otic solution [id number]  Administer into affected ear(s). Historical Provider, MD  Active   methylPREDNISolone (Medrol Dospak) 4 mg tablets [id number]  Use as directed by package instructions Booker Lacey MD  Active   multivitamin tablet [id number]  Take by mouth. Historical MD Antonia  Active   oxyCODONE-acetaminophen (Percocet) 5-325 mg tablet [id number]  Take 1 tablet by mouth every 6 hours if needed. Historical Provider, MD  Active   pediatric multivitamin tablet chewable split tablet [id number]  Take by mouth. Historical Provider, MD  Active                    No Known Allergies    Social History     Socioeconomic History    Marital status: Single     Spouse name: Not on file    Number of children: Not on file    Years of education: Not on file    Highest education level: Not on file    Occupational History    Not on file   Tobacco Use    Smoking status: Never     Passive exposure: Never    Smokeless tobacco: Never   Vaping Use    Vaping status: Never Used   Substance and Sexual Activity    Alcohol use: Not on file    Drug use: Not on file    Sexual activity: Not Currently   Other Topics Concern    Not on file   Social History Narrative    Not on file     Social Determinants of Health     Financial Resource Strain: Not on file   Food Insecurity: Not on file   Transportation Needs: Not on file   Physical Activity: Not on file   Stress: Not on file   Social Connections: Not on file   Intimate Partner Violence: Not on file   Housing Stability: Not on file       Past Surgical History:   Procedure Laterality Date    TONSILLECTOMY  04/24/2013    Tonsillectomy With Adenoidectomy    TYMPANOSTOMY TUBE PLACEMENT  04/24/2013    Ear Pressure Equalization Tube       No results found.                           Scribe Attestation  By signing my name below, Breanna DASILVA Scribe   attest that this documentation has been prepared under the direction and in the presence of Booker Lacey MD.

## 2024-06-05 PROBLEM — S43.431A SUPERIOR GLENOID LABRUM LESION OF RIGHT SHOULDER: Status: ACTIVE | Noted: 2024-06-05

## 2024-06-07 ENCOUNTER — TELEPHONE (OUTPATIENT)
Dept: ORTHOPEDIC SURGERY | Facility: CLINIC | Age: 19
End: 2024-06-07
Payer: COMMERCIAL

## 2024-06-07 NOTE — TELEPHONE ENCOUNTER
06/07/24  Attempted to contact pt to schedule for brace fitting for post-op use.  Spoke w/ pt's mother since he was working and scheduled fitting appt for 06/10/24 [place]

## 2024-06-07 NOTE — PREPROCEDURE INSTRUCTIONS
Pre-op instructions reviewed with mother including NPO after midnight, must have , hospital check-in location, and day of surgery routine.  Mom is aware that pt has PAT labs ordered.

## 2024-06-08 LAB
ALBUMIN SERPL BCP-MCNC: 4.4 G/DL (ref 3.4–5)
ALP SERPL-CCNC: 39 U/L (ref 33–120)
ALT SERPL W P-5'-P-CCNC: 45 U/L (ref 10–52)
ANION GAP SERPL CALC-SCNC: 11 MMOL/L (ref 10–20)
AST SERPL W P-5'-P-CCNC: 33 U/L (ref 9–39)
BASOPHILS # BLD AUTO: 0.03 X10*3/UL (ref 0–0.1)
BASOPHILS NFR BLD AUTO: 0.5 %
BILIRUB SERPL-MCNC: 0.4 MG/DL (ref 0–1.2)
BUN SERPL-MCNC: 20 MG/DL (ref 6–23)
CALCIUM SERPL-MCNC: 9.4 MG/DL (ref 8.6–10.3)
CHLORIDE SERPL-SCNC: 107 MMOL/L (ref 98–107)
CO2 SERPL-SCNC: 25 MMOL/L (ref 21–32)
CREAT SERPL-MCNC: 0.92 MG/DL (ref 0.5–1.3)
EGFRCR SERPLBLD CKD-EPI 2021: >90 ML/MIN/1.73M*2
EOSINOPHIL # BLD AUTO: 0.17 X10*3/UL (ref 0–0.7)
EOSINOPHIL NFR BLD AUTO: 2.8 %
ERYTHROCYTE [DISTWIDTH] IN BLOOD BY AUTOMATED COUNT: 13.1 % (ref 11.5–14.5)
ESTRADIOL SERPL-MCNC: <19 PG/ML
FSH SERPL-ACNC: 3.3 IU/L
GLUCOSE SERPL-MCNC: 93 MG/DL (ref 74–99)
HCT VFR BLD AUTO: 43.3 % (ref 41–52)
HGB BLD-MCNC: 14.7 G/DL (ref 13.5–17.5)
IMM GRANULOCYTES # BLD AUTO: 0.02 X10*3/UL (ref 0–0.7)
IMM GRANULOCYTES NFR BLD AUTO: 0.3 % (ref 0–0.9)
LH SERPL-ACNC: 4.1 IU/L
LYMPHOCYTES # BLD AUTO: 1.83 X10*3/UL (ref 1.2–4.8)
LYMPHOCYTES NFR BLD AUTO: 30.7 %
MCH RBC QN AUTO: 31.2 PG (ref 26–34)
MCHC RBC AUTO-ENTMCNC: 33.9 G/DL (ref 32–36)
MCV RBC AUTO: 92 FL (ref 80–100)
MONOCYTES # BLD AUTO: 0.79 X10*3/UL (ref 0.1–1)
MONOCYTES NFR BLD AUTO: 13.2 %
NEUTROPHILS # BLD AUTO: 3.13 X10*3/UL (ref 1.2–7.7)
NEUTROPHILS NFR BLD AUTO: 52.5 %
NRBC BLD-RTO: 0 /100 WBCS (ref 0–0)
PLATELET # BLD AUTO: 274 X10*3/UL (ref 150–450)
POTASSIUM SERPL-SCNC: 4.6 MMOL/L (ref 3.5–5.3)
PROT SERPL-MCNC: 6.8 G/DL (ref 6.4–8.2)
RBC # BLD AUTO: 4.71 X10*6/UL (ref 4.5–5.9)
SODIUM SERPL-SCNC: 138 MMOL/L (ref 136–145)
TSH SERPL-ACNC: 1.3 MIU/L (ref 0.44–3.98)
WBC # BLD AUTO: 6 X10*3/UL (ref 4.4–11.3)

## 2024-06-08 PROCEDURE — 84443 ASSAY THYROID STIM HORMONE: CPT | Performed by: PEDIATRICS

## 2024-06-08 PROCEDURE — 83001 ASSAY OF GONADOTROPIN (FSH): CPT | Mod: ELYLAB | Performed by: PEDIATRICS

## 2024-06-08 PROCEDURE — 80053 COMPREHEN METABOLIC PANEL: CPT | Performed by: PEDIATRICS

## 2024-06-08 PROCEDURE — 84402 ASSAY OF FREE TESTOSTERONE: CPT | Performed by: PEDIATRICS

## 2024-06-08 PROCEDURE — 85025 COMPLETE CBC W/AUTO DIFF WBC: CPT | Performed by: ORTHOPAEDIC SURGERY

## 2024-06-08 PROCEDURE — 82670 ASSAY OF TOTAL ESTRADIOL: CPT | Mod: ELYLAB | Performed by: PEDIATRICS

## 2024-06-08 PROCEDURE — 84270 ASSAY OF SEX HORMONE GLOBUL: CPT | Performed by: PEDIATRICS

## 2024-06-08 PROCEDURE — 83002 ASSAY OF GONADOTROPIN (LH): CPT | Mod: ELYLAB | Performed by: PEDIATRICS

## 2024-06-10 ENCOUNTER — APPOINTMENT (OUTPATIENT)
Dept: ORTHOPEDIC SURGERY | Facility: CLINIC | Age: 19
End: 2024-06-10
Payer: COMMERCIAL

## 2024-06-10 DIAGNOSIS — S43.431A LABRAL TEAR OF SHOULDER, RIGHT, INITIAL ENCOUNTER: ICD-10-CM

## 2024-06-10 LAB — SHBG SERPL-SCNC: 12 NMOL/L (ref 17–56)

## 2024-06-10 RX ORDER — OXYCODONE AND ACETAMINOPHEN 5; 325 MG/1; MG/1
1 TABLET ORAL EVERY 6 HOURS PRN
Qty: 20 TABLET | Refills: 0 | Status: SHIPPED | OUTPATIENT
Start: 2024-06-12 | End: 2024-06-19

## 2024-06-11 ENCOUNTER — ANESTHESIA EVENT (OUTPATIENT)
Dept: OPERATING ROOM | Facility: HOSPITAL | Age: 19
End: 2024-06-11
Payer: COMMERCIAL

## 2024-06-11 ENCOUNTER — APPOINTMENT (OUTPATIENT)
Dept: ORTHOPEDIC SURGERY | Facility: CLINIC | Age: 19
End: 2024-06-11
Payer: COMMERCIAL

## 2024-06-11 ENCOUNTER — APPOINTMENT (OUTPATIENT)
Dept: RADIOLOGY | Facility: HOSPITAL | Age: 19
End: 2024-06-11
Payer: COMMERCIAL

## 2024-06-12 ENCOUNTER — HOSPITAL ENCOUNTER (OUTPATIENT)
Facility: HOSPITAL | Age: 19
Setting detail: OUTPATIENT SURGERY
Discharge: HOME | End: 2024-06-12
Attending: ORTHOPAEDIC SURGERY | Admitting: ORTHOPAEDIC SURGERY
Payer: COMMERCIAL

## 2024-06-12 ENCOUNTER — ANESTHESIA (OUTPATIENT)
Dept: OPERATING ROOM | Facility: HOSPITAL | Age: 19
End: 2024-06-12
Payer: COMMERCIAL

## 2024-06-12 VITALS
WEIGHT: 162.92 LBS | HEART RATE: 80 BPM | SYSTOLIC BLOOD PRESSURE: 117 MMHG | HEIGHT: 69 IN | RESPIRATION RATE: 17 BRPM | BODY MASS INDEX: 24.13 KG/M2 | DIASTOLIC BLOOD PRESSURE: 61 MMHG | OXYGEN SATURATION: 97 % | TEMPERATURE: 97.2 F

## 2024-06-12 DIAGNOSIS — S43.431A SUPERIOR GLENOID LABRUM LESION OF RIGHT SHOULDER, INITIAL ENCOUNTER: Primary | ICD-10-CM

## 2024-06-12 PROCEDURE — 2500000004 HC RX 250 GENERAL PHARMACY W/ HCPCS (ALT 636 FOR OP/ED): Performed by: PHYSICIAN ASSISTANT

## 2024-06-12 PROCEDURE — C1713 ANCHOR/SCREW BN/BN,TIS/BN: HCPCS | Performed by: ORTHOPAEDIC SURGERY

## 2024-06-12 PROCEDURE — 2720000007 HC OR 272 NO HCPCS: Performed by: ORTHOPAEDIC SURGERY

## 2024-06-12 PROCEDURE — 7100000002 HC RECOVERY ROOM TIME - EACH INCREMENTAL 1 MINUTE: Performed by: ORTHOPAEDIC SURGERY

## 2024-06-12 PROCEDURE — 29806 SHO ARTHRS SRG CAPSULORRAPHY: CPT | Performed by: ORTHOPAEDIC SURGERY

## 2024-06-12 PROCEDURE — 2500000005 HC RX 250 GENERAL PHARMACY W/O HCPCS: Performed by: ANESTHESIOLOGY

## 2024-06-12 PROCEDURE — 7100000001 HC RECOVERY ROOM TIME - INITIAL BASE CHARGE: Performed by: ORTHOPAEDIC SURGERY

## 2024-06-12 PROCEDURE — 2780000003 HC OR 278 NO HCPCS: Performed by: ORTHOPAEDIC SURGERY

## 2024-06-12 PROCEDURE — 7100000009 HC PHASE TWO TIME - INITIAL BASE CHARGE: Performed by: ORTHOPAEDIC SURGERY

## 2024-06-12 PROCEDURE — 3700000002 HC GENERAL ANESTHESIA TIME - EACH INCREMENTAL 1 MINUTE: Performed by: ORTHOPAEDIC SURGERY

## 2024-06-12 PROCEDURE — 7100000010 HC PHASE TWO TIME - EACH INCREMENTAL 1 MINUTE: Performed by: ORTHOPAEDIC SURGERY

## 2024-06-12 PROCEDURE — 2500000004 HC RX 250 GENERAL PHARMACY W/ HCPCS (ALT 636 FOR OP/ED): Mod: JZ | Performed by: PHYSICIAN ASSISTANT

## 2024-06-12 PROCEDURE — 3700000001 HC GENERAL ANESTHESIA TIME - INITIAL BASE CHARGE: Performed by: ORTHOPAEDIC SURGERY

## 2024-06-12 PROCEDURE — 2500000004 HC RX 250 GENERAL PHARMACY W/ HCPCS (ALT 636 FOR OP/ED): Performed by: ANESTHESIOLOGY

## 2024-06-12 PROCEDURE — 3600000004 HC OR TIME - INITIAL BASE CHARGE - PROCEDURE LEVEL FOUR: Performed by: ORTHOPAEDIC SURGERY

## 2024-06-12 PROCEDURE — 3600000009 HC OR TIME - EACH INCREMENTAL 1 MINUTE - PROCEDURE LEVEL FOUR: Performed by: ORTHOPAEDIC SURGERY

## 2024-06-12 DEVICE — SELF BUNCHING KL 1.8 FIBERTAK, SHOULDER
Type: IMPLANTABLE DEVICE | Site: SHOULDER | Status: FUNCTIONAL
Brand: ARTHREX®

## 2024-06-12 DEVICE — PUSHLOCK, 2.9MM BIOCOMPOSITE: Type: IMPLANTABLE DEVICE | Site: SHOULDER | Status: FUNCTIONAL

## 2024-06-12 RX ORDER — MEPERIDINE HYDROCHLORIDE 25 MG/ML
12.5 INJECTION INTRAMUSCULAR; INTRAVENOUS; SUBCUTANEOUS EVERY 10 MIN PRN
Status: DISCONTINUED | OUTPATIENT
Start: 2024-06-12 | End: 2024-06-12 | Stop reason: HOSPADM

## 2024-06-12 RX ORDER — FENTANYL CITRATE 50 UG/ML
25 INJECTION, SOLUTION INTRAMUSCULAR; INTRAVENOUS EVERY 5 MIN PRN
Status: DISCONTINUED | OUTPATIENT
Start: 2024-06-12 | End: 2024-06-12 | Stop reason: HOSPADM

## 2024-06-12 RX ORDER — ONDANSETRON HYDROCHLORIDE 2 MG/ML
INJECTION, SOLUTION INTRAVENOUS AS NEEDED
Status: DISCONTINUED | OUTPATIENT
Start: 2024-06-12 | End: 2024-06-12

## 2024-06-12 RX ORDER — MIDAZOLAM HYDROCHLORIDE 1 MG/ML
INJECTION, SOLUTION INTRAMUSCULAR; INTRAVENOUS AS NEEDED
Status: DISCONTINUED | OUTPATIENT
Start: 2024-06-12 | End: 2024-06-12

## 2024-06-12 RX ORDER — PROPOFOL 10 MG/ML
INJECTION, EMULSION INTRAVENOUS AS NEEDED
Status: DISCONTINUED | OUTPATIENT
Start: 2024-06-12 | End: 2024-06-12

## 2024-06-12 RX ORDER — OXYCODONE HYDROCHLORIDE 5 MG/1
10 TABLET ORAL EVERY 4 HOURS PRN
Status: DISCONTINUED | OUTPATIENT
Start: 2024-06-12 | End: 2024-06-12 | Stop reason: HOSPADM

## 2024-06-12 RX ORDER — SODIUM CHLORIDE 9 MG/ML
100 INJECTION, SOLUTION INTRAVENOUS CONTINUOUS
Status: DISCONTINUED | OUTPATIENT
Start: 2024-06-12 | End: 2024-06-12 | Stop reason: HOSPADM

## 2024-06-12 RX ORDER — FENTANYL CITRATE 50 UG/ML
50 INJECTION, SOLUTION INTRAMUSCULAR; INTRAVENOUS EVERY 5 MIN PRN
Status: DISCONTINUED | OUTPATIENT
Start: 2024-06-12 | End: 2024-06-12 | Stop reason: HOSPADM

## 2024-06-12 RX ORDER — OXYCODONE HYDROCHLORIDE 5 MG/1
5 TABLET ORAL EVERY 4 HOURS PRN
Status: DISCONTINUED | OUTPATIENT
Start: 2024-06-12 | End: 2024-06-12 | Stop reason: HOSPADM

## 2024-06-12 RX ORDER — SODIUM CHLORIDE 0.9 G/100ML
IRRIGANT IRRIGATION AS NEEDED
Status: DISCONTINUED | OUTPATIENT
Start: 2024-06-12 | End: 2024-06-12 | Stop reason: HOSPADM

## 2024-06-12 RX ORDER — CEFAZOLIN SODIUM 2 G/100ML
2 INJECTION, SOLUTION INTRAVENOUS ONCE
Status: COMPLETED | OUTPATIENT
Start: 2024-06-12 | End: 2024-06-12

## 2024-06-12 RX ORDER — ROCURONIUM BROMIDE 10 MG/ML
INJECTION, SOLUTION INTRAVENOUS AS NEEDED
Status: DISCONTINUED | OUTPATIENT
Start: 2024-06-12 | End: 2024-06-12

## 2024-06-12 RX ORDER — SODIUM CHLORIDE, SODIUM LACTATE, POTASSIUM CHLORIDE, CALCIUM CHLORIDE 600; 310; 30; 20 MG/100ML; MG/100ML; MG/100ML; MG/100ML
100 INJECTION, SOLUTION INTRAVENOUS CONTINUOUS
Status: DISCONTINUED | OUTPATIENT
Start: 2024-06-12 | End: 2024-06-12 | Stop reason: HOSPADM

## 2024-06-12 RX ORDER — LIDOCAINE HYDROCHLORIDE 20 MG/ML
INJECTION, SOLUTION INFILTRATION; PERINEURAL AS NEEDED
Status: DISCONTINUED | OUTPATIENT
Start: 2024-06-12 | End: 2024-06-12

## 2024-06-12 RX ORDER — ROPIVACAINE HYDROCHLORIDE 5 MG/ML
30 INJECTION, SOLUTION EPIDURAL; INFILTRATION; PERINEURAL ONCE
Status: COMPLETED | OUTPATIENT
Start: 2024-06-12 | End: 2024-06-12

## 2024-06-12 RX ORDER — ACETAMINOPHEN 325 MG/1
650 TABLET ORAL EVERY 4 HOURS PRN
Status: DISCONTINUED | OUTPATIENT
Start: 2024-06-12 | End: 2024-06-12 | Stop reason: HOSPADM

## 2024-06-12 RX ORDER — DIPHENHYDRAMINE HYDROCHLORIDE 50 MG/ML
INJECTION INTRAMUSCULAR; INTRAVENOUS AS NEEDED
Status: DISCONTINUED | OUTPATIENT
Start: 2024-06-12 | End: 2024-06-12

## 2024-06-12 RX ORDER — FENTANYL CITRATE 50 UG/ML
INJECTION, SOLUTION INTRAMUSCULAR; INTRAVENOUS AS NEEDED
Status: DISCONTINUED | OUTPATIENT
Start: 2024-06-12 | End: 2024-06-12

## 2024-06-12 ASSESSMENT — PAIN SCALES - GENERAL
PAINLEVEL_OUTOF10: 0 - NO PAIN

## 2024-06-12 ASSESSMENT — PAIN - FUNCTIONAL ASSESSMENT
PAIN_FUNCTIONAL_ASSESSMENT: 0-10

## 2024-06-12 NOTE — ANESTHESIA PROCEDURE NOTES
Peripheral Block    Patient location during procedure: pre-op  Start time: 6/12/2024 10:52 AM  End time: 6/12/2024 10:58 AM  Reason for block: at surgeon's request and post-op pain management  Staffing  Performed: attending   Authorized by: Stephen Leos MD    Performed by: Stephen Leos MD  Preanesthetic Checklist  Completed: patient identified, IV checked, site marked, risks and benefits discussed, surgical consent, monitors and equipment checked, pre-op evaluation and timeout performed   Timeout performed at: 6/12/2024 10:52 AM  Peripheral Block  Patient position: sitting  Prep: ChloraPrep  Patient monitoring: heart rate, cardiac monitor and continuous pulse ox  Block type: interscalene  Laterality: right  Injection technique: single-shot  Guidance: ultrasound guided  Local infiltration: lidocaine  Infiltration strength: 1 %  Dose: 1 mL  Needle  Needle type: pencil-tip   Needle gauge: 22 G  Needle length: 5 cm  Needle localization: ultrasound guidance     image stored in chart  Assessment  Injection assessment: negative aspiration for heme, no paresthesia on injection, incremental injection and local visualized surrounding nerve on ultrasound  Paresthesia pain: none  Heart rate change: no  Slow fractionated injection: yes  Additional Notes  Procedure in Detail:     Prior to the procedure, the correct patient, procedure and site were confirmed, and the chart reviewed.  Informed consent discussing the risks and benefits of the procedure was obtained.  Risks discussed included, but were not limited to, the possibility of infection, bleeding, phrenic nerve block, and permanent nerve damage.     A Neurologic Exam revealed: Grossly Normal .            The interscalene region was prepped and draped in a sterile fashion.  The procedure was completed using ultrasound guidance without  nerve stimulation. A linear ultrasound probe placed posterior to the sternocleiodomastoid muscle in the axial oblique plane.  The  sternocleiodomastoid muscle was identified superficially, along with the anterior and middle scalene muscles. Nerve roots were visualized in the interscalene groove.  The anatomy was noted to be normal]. 1% lidocaine was infiltrated at the needle insertion site. Using a 22ga 5cm insulated needle the nerve roots were approached from the lateral to medial in plane with the probe.      After negative aspiration, slow injection of 20 mls local anesthetic solution containing 0.5% Ropivacaine without additives. The tip of the needle was visualized throughout the procedure.  A total of 1 pass with the needle were made prior to the start of local anesthetic injection.  The needle was repositioned 1 time during the procedure. There was not evidence of intraneural injection.     Pain Diagnosis: shoulder     Complexity and/or technical difficulty of this procedure were increased due to: NA

## 2024-06-12 NOTE — ANESTHESIA PREPROCEDURE EVALUATION
Merritt Brown is a 18 y.o. male here for:      ARTHROSCOPY LABRUM REPAIR  With Booker Lacey MD  Pre-Op Diagnosis Codes:     * Sprain and strain of shoulder and upper arm [S43.431A]    Lab Results   Component Value Date    HGB 14.7 06/08/2024    HCT 43.3 06/08/2024    WBC 6.0 06/08/2024     06/08/2024     06/08/2024    K 4.6 06/08/2024     06/08/2024    CREATININE 0.92 06/08/2024    BUN 20 06/08/2024       Social History     Substance and Sexual Activity   Drug Use Never      Tobacco Use: Low Risk  (6/12/2024)    Patient History     Smoking Tobacco Use: Never     Smokeless Tobacco Use: Never     Passive Exposure: Never      Social History     Substance and Sexual Activity   Alcohol Use Never        No Known Allergies    Current Outpatient Medications   Medication Instructions    oxyCODONE-acetaminophen (Percocet) 5-325 mg tablet 1 tablet, oral, Every 6 hours PRN       Past Medical History:   Diagnosis Date    Achilles tendinitis, unspecified leg 03/21/2018    Achilles tendinitis    Acute upper respiratory infection, unspecified 04/11/2019    Acute upper respiratory infection    Allergic contact dermatitis due to plants, except food 08/05/2015    Contact dermatitis due to poison ivy    Body mass index (BMI) pediatric, 5th percentile to less than 85th percentile for age 10/16/2019    BMI (body mass index), pediatric, 5% to less than 85% for age    Encounter for immunization 11/20/2020    Encounter for administration of vaccine    Encounter for routine child health examination without abnormal findings 10/17/2019    Encounter for routine child health examination without abnormal findings    Encounter for routine child health examination without abnormal findings 11/17/2017    Encounter for routine child health examination w/o abnormal findings    Hypoglycemia 10/04/2023    Iliotibial band syndrome, unspecified leg 03/17/2017    Iliotibial band syndrome    Immunization not carried out because  of patient decision for unspecified reason 10/16/2019    Immunization not carried out because of patient decision    Other acute nonsuppurative otitis media, unspecified ear 08/05/2015    Acute nonsuppurative otitis media    Other conditions influencing health status 02/22/2017    History of cough    Other infective otitis externa, unspecified ear 07/29/2013    Infective otitis externa    Otitis media, unspecified, left ear 04/11/2019    Acute left otitis media    Otitis media, unspecified, unspecified ear 10/16/2019    Recurrent acute otitis media    Overweight 11/10/2017    Overweight, pediatric, BMI 85.0-94.9 percentile for age    Pain in left hip 03/17/2017    Left hip pain in pediatric patient    Pain in unspecified ankle and joints of unspecified foot 03/21/2018    Ankle pain    Personal history of diseases of the skin and subcutaneous tissue 11/18/2021    History of cellulitis    Personal history of diseases of the skin and subcutaneous tissue 09/21/2016    History of impetigo    Personal history of diseases of the skin and subcutaneous tissue 07/24/2015    History of sunburn    Personal history of other diseases of the nervous system and sense organs 02/22/2017    History of acute otitis media    Personal history of other diseases of the nervous system and sense organs 07/29/2013    History of acute otitis media    Personal history of other diseases of the respiratory system 02/22/2017    History of acute pharyngitis    Personal history of other diseases of the respiratory system 11/03/2021    History of acute pharyngitis    Personal history of other diseases of the respiratory system 02/18/2014    History of upper respiratory infection    Personal history of other drug therapy 11/10/2017    History of influenza vaccination    Personal history of other infectious and parasitic diseases 09/21/2016    History of viral exanthem    Rash and other nonspecific skin eruption 09/21/2016    Rash    Recurrent  "dislocation of right shoulder     Unspecified injury of left foot, initial encounter 02/19/2018    Injury of small toe, left, initial encounter    Unspecified injury of unspecified ankle, initial encounter 03/21/2018    Ankle injury       Past Surgical History:   Procedure Laterality Date    SHOULDER ARTHROSCOPY W/ LABRAL REPAIR Right 06/2023    TONSILLECTOMY  04/24/2013    Tonsillectomy With Adenoidectomy    TYMPANOSTOMY TUBE PLACEMENT  04/24/2013    Ear Pressure Equalization Tube       Family History   Problem Relation Name Age of Onset    Asthma Sister      Asthma Brother      Other (Blood pressure isolated elevated) Maternal Grandmother      Hyperlipidemia Maternal Grandmother      Other (Myocardial infarctin) Maternal Grandfather         Relevant Problems   Neuro   (+) CTS (carpal tunnel syndrome)      Musculoskeletal   (+) CTS (carpal tunnel syndrome)       Visit Vitals  /63   Pulse 61   Temp 36.7 °C (98.1 °F) (Temporal)   Resp 19   Ht 1.753 m (5' 9\")   Wt 73.9 kg (162 lb 14.7 oz)   SpO2 100%   BMI 24.06 kg/m²   Smoking Status Never   BSA 1.9 m²       NPO Details:  NPO/Void Status  Carbohydrate Drink Given Prior to Surgery? : N  Date of Last Liquid: 06/11/24  Time of Last Liquid: 2000  Date of Last Solid: 06/11/24  Time of Last Solid: 0900  Last Intake Type: Clear fluids  Time of Last Void: 0800        Physical Exam    Airway  Mallampati: II     Cardiovascular - normal exam     Dental - normal exam     Pulmonary - normal exam     Abdominal - normal exam             Anesthesia Plan    History of general anesthesia?: yes  History of complications of general anesthesia?: no    ASA 1     general and regional     intravenous induction   Anesthetic plan and risks discussed with patient.      "

## 2024-06-12 NOTE — ANESTHESIA PROCEDURE NOTES
Airway  Date/Time: 6/12/2024 11:22 AM  Urgency: elective    Airway not difficult    Staffing  Performed: attending   Authorized by: Stephen Leos MD    Performed by: Stephen Leos MD  Patient location during procedure: OR    Indications and Patient Condition  Indications for airway management: anesthesia  Spontaneous ventilation: present  Sedation level: minimal  Preoxygenated: yes  Patient position: sniffing  MILS not maintained throughout  Mask difficulty assessment: 1 - vent by mask    Final Airway Details  Final airway type: endotracheal airway      Successful airway: ETT  Cuffed: yes   Successful intubation technique: video laryngoscopy  Facilitating devices/methods: intubating stylet  Endotracheal tube insertion site: oral  Blade type: [employer].  Blade size: #4  ETT size (mm): 7.5  Cormack-Lehane Classification: grade I - full view of glottis  Placement verified by: capnometry   Measured from: lips  ETT to lips (cm): 24  Number of attempts at approach: 1  Ventilation between attempts: none  Number of other approaches attempted: 1    Other Attempts  Unsuccessful attempted endotracheal techniques: direct laryngoscopy (mac 4 g3view)

## 2024-06-12 NOTE — OP NOTE
ARTHROSCOPY LABRUM REPAIR (R) Operative Note     Date: 2024  OR Location: [facility] OR    Name: Merritt Brown, : 2005, Age: 18 y.o., MRN: [id number], Sex: male    Diagnosis  Pre-op Diagnosis     * Superior glenoid labrum lesion of right shoulder, initial encounter [S43.431A] Post-op Diagnosis     * Superior glenoid labrum lesion of right shoulder, initial encounter [S43.431A]     Procedures  ARTHROSCOPY LABRUM REPAIR  47204 - SD SURGICAL ARTHROSCOPY SHOULDER CAPSULORRHAPHY  Right shoulder arthroscopic Bankart repair/anterior labral repair  Right shoulder arthroscopic posterior labral repair with capsulorrhaphy  Right shoulder arthroscopic hardware removal    Surgeons      * Booker Lacey - Primary    Resident/Fellow/Other Assistant:  Surgeons and Role:  * No surgeons found with a matching role *Michelle Martinez PA-C    Procedure Summary  Anesthesia: General  ASA: I  Anesthesia Staff: Anesthesiologist: Stephen Leos MD  Estimated Blood Loss: Minimal mL  Intra-op Medications:   Administrations occurring from 1000 to 1140 on 24:   Medication Name Total Dose   ceFAZolin in dextrose (iso-os) (Ancef) IVPB 2 g 2 g   ropivacaine (Naropin) injection 150 mg 150 mg              Anesthesia Record               Intraprocedure I/O Totals          Intake    ceFAZolin in dextrose (iso-os) (Ancef) IVPB 2 g 100.00 mL    Total Intake 100 mL          Specimen: No specimens collected     Staff:   Circulator: Julianne Serranoub Person: Samira  Circulator: Julianne Clements Scrub: Chu         Drains and/or Catheters: * None in log *    Tourniquet Times:         Implants:  Implants       Type Name Action Serial No.      Implant SUTURE, FIBERTAK, KNOTLESS 1.8, GEN2 W/P2 - [id number] Implanted      Implant PUSHLOCK, 2.9MM BIOCOMPOSITE - [id number] Implanted      Implant SUTURE, FIBERTAK, KNOTLESS 1.8, GEN2 W/P2 - [id number] Implanted      Implant SUTURE, FIBERTAK, KNOTLESS 1.8, GEN2 W/P2 - [id number] Implanted      Implant  SUTURE, FIBERTAK, KNOTLESS 1.8, GEN2 W/P2 - [id number] Implanted      Implant SUTURE, FIBERTAK, KNOTLESS 1.8, GEN2 W/P2 - [id number] Implanted      Implant SUTURE, FIBERTAK, KNOTLESS 1.8, GEN2 W/P2 - [id number] Implanted             Indications: 18-year-old male reinjured his shoulder causing a dislocation resulting in a complex labral tear elected proceed with surgery for right shoulder revision labrum repair    Risks benefits and alternatives to surgery were discussed including but not limited to Infection, bleeding, neurovascular injury, pain and dysfunction, hardware related complications including cutout failure breakage, loss of function, motion, and permanent disability as well as the cardiovascular and pulmonary complications from anesthesia including death and DVT. Patient and family accept these risks.  We discussed specifically hardware related complications including anchor cut out, failure, breakage, recurrent instability, posttraumatic arthritis, loss in strength, function or motion and the possible need for revision surgeries    Patient identified in the preop area.  Right upper extremity marked and confirmed with patient as the operative site.  Brought back to the operating room and anesthetized under general anesthesia.  Right upper extremity was then prepped and draped in standard sterile fashion.  Patient, site and procedure were confirmed with timeout.  Everyone in the room agreed.  Preop antibiotics were given.  Patient was given a preoperative scalene nerve block.  Placed into a lateral decubitus position with bony prominences well-padded  Traction was then placed onto the arm    We then made standard glenohumeral working portals beginning posteriorly.  Then created 2 separate anterior mid glenoid portal.  Cannula was placed.  Glenohumeral findings: Patient a complex tear of the anterior labrum.  Basically torn through the inferior aspect of the prior repair.  His prior anchors and sutures were  intact with the had torn through the labrum and capsule.  We were able to elevate the labral capsular injury along the anterior neck.  Patient had a notable tear in the anterior labrum which extended from 2:00 to 6:00.  The biceps anchor was intact.  We then sequentially begin elevating the labrum from superior to inferiorly to gain access to the glenoid neck.  Debrided the glenoid neck with the shaver to increase to healing potential.  Once we had the capsule labrum elevated, we then placed sequential sutures.  Inferiorly we placed a cinch stitch with a combination  and 45 degree lasso and a fiber link.  This allowed us to access the inferior aspect of the labrum.  Placed our inferior anchor first.  He has some arthroscopic hardware and sutures which were removed both anterior and posterior during the visualization and working.  We then placed 2 separate knotless fiber tack anchors a superior in the glenoid.  A drill guide was placed at the glenoid face and unicortical drilled.  Sequentially passed the sutures in both the horizontal fashion and middle anchor as well as a simple fashion superiorly.  These were sequentially tightened.  This advanced the capsule and the labrum with the glenoid rim.  The labrum was then advanced and the sutures were cut flush.   With excellent bite with all of our sutures and we confirmed improved the stability in the repair of our labral tear.  Patient does not have a significant posterior Hill-Sachs deformity.  However he did have a notable posterior labral tear.  The labral tear that extended from the 6 to 9 o'clock position.  We then proceeded with labral repair.  Placed a percutaneous portal for drilling.  Placed the 2 separate knotless fiber tack anchors.  The sutures were relayed through the posterior labrum and capsule with a 90 degree lasso.  We sequentially tightened her sutures posteriorly this dramatically improved the capsule labral complex and posteriorly to adequately  repair  Then closed our posterior portal through the cannula.  We sequentially placed deep stitch.  #2 Ethibond suture was used to close our posterior portal and imbricate the remaining capsule.  Shoulder joint was concentric.  No additional repair was required.  Cartilage surfaces were intact and normal in both humerus and glenoid.  This concluded the procedure.  Wounds were irrigated with normal saline.  Portals were closed in standard fashion.  Sterile dressings were applied.  Patient placed into a sling.  Patient was then sent to the PACU in stable condition.    Michelle Martinez PA-C was present throughout the entire case. Given the nature of the disease process and the procedure, a skilled surgical first assistant was necessary during the case. The assistant was necessary to hold retractors and to manipulate the extremity during the procedure. A certified scrub tech was at the back table managing the instruments and supplies for the surgical case.  Booker Lacey  Phone Number: [phone]

## 2024-06-13 ASSESSMENT — PAIN SCALES - GENERAL: PAIN_LEVEL: 0

## 2024-06-13 NOTE — ANESTHESIA POSTPROCEDURE EVALUATION
Patient: Merritt Brown    Procedure Summary       Date: 06/12/24 Room / Location: [facility] OR 04 / [facility]    Anesthesia Start: 1113 Anesthesia Stop: 1428    Procedure: ARTHROSCOPY LABRUM REPAIR (Right: Shoulder) Diagnosis:       Superior glenoid labrum lesion of right shoulder, initial encounter      (Superior glenoid labrum lesion of right shoulder, initial encounter [S43.431A])    Surgeons: Booker Lacey MD Responsible Provider: Stephen Leos MD    Anesthesia Type: general, regional ASA Status: 1            Anesthesia Type: general, regional    Vitals Value Taken Time   /68 06/12/24 1503   Temp 36 °C (96.8 °F) 06/12/24 1455   Pulse 84 06/12/24 1504   Resp 12 06/12/24 1504   SpO2 98 % 06/12/24 1504   Vitals shown include unfiled device data.    Anesthesia Post Evaluation    Patient location during evaluation: PACU  Patient participation: complete - patient participated  Level of consciousness: awake  Pain score: 0  Pain management: adequate  Multimodal analgesia pain management approach  Airway patency: patent  Cardiovascular status: acceptable and hemodynamically stable  Respiratory status: acceptable, nonlabored ventilation and room air  Hydration status: acceptable  Postoperative Nausea and Vomiting: none        No notable events documented.

## 2024-06-14 LAB
TESTOSTERONE FREE (CHAN): 65.7 PG/ML (ref 35–155)
TESTOSTERONE,TOTAL,LC-MS/MS: 304 NG/DL (ref 250–1100)

## 2024-06-17 NOTE — PROGRESS NOTES
History of Present Illness:  Date of Surgery: 06/12/24 right shoulder arthroscopy labral repair. Patient a complex tear of the anterior labrum. Basically torn through the inferior aspect of the prior repair. Patient has had some persistent paresthesias in the median 3 digits since surgery, he has been working on gentle elbow, wrist, and hand, motion    Exam:  Mild effusion  Incisions are clean, dry, intact, and healing well.   Good range of motion  No calf swelling   Negative Marcel's test  Distal neurovascular exam intact    Assessment:  Patient status post right shoulder arthroscopy labral repair    Plan:  Reviewed arthroscopic photos and findings  Formal PT to start in 4 weeks  Follow-up in 4 weeks              Scribe Attestation  By signing my name below, IBreanna Scribe   attest that this documentation has been prepared under the direction and in the presence of Booker Lacey MD.

## 2024-06-20 ENCOUNTER — APPOINTMENT (OUTPATIENT)
Dept: ORTHOPEDIC SURGERY | Facility: CLINIC | Age: 19
End: 2024-06-20
Payer: COMMERCIAL

## 2024-06-20 DIAGNOSIS — Z98.890 STATUS POST LABRAL REPAIR OF SHOULDER: ICD-10-CM

## 2024-06-20 PROCEDURE — 99024 POSTOP FOLLOW-UP VISIT: CPT | Performed by: ORTHOPAEDIC SURGERY

## 2024-06-20 PROCEDURE — 3008F BODY MASS INDEX DOCD: CPT | Performed by: ORTHOPAEDIC SURGERY

## 2024-06-24 ENCOUNTER — TELEPHONE (OUTPATIENT)
Dept: PEDIATRICS | Facility: CLINIC | Age: 19
End: 2024-06-24
Payer: COMMERCIAL

## 2024-06-24 DIAGNOSIS — Z78.9 TAKES DIETARY SUPPLEMENTS: ICD-10-CM

## 2024-06-24 DIAGNOSIS — E55.9 VITAMIN D INSUFFICIENCY: ICD-10-CM

## 2024-06-24 DIAGNOSIS — E78.00 HYPERCHOLESTEROLEMIA: ICD-10-CM

## 2024-06-24 DIAGNOSIS — R79.89 LOW TESTOSTERONE IN MALE: Primary | ICD-10-CM

## 2024-06-24 NOTE — TELEPHONE ENCOUNTER
Review of chart:     -seen for well visit 10/6/2023: at that time, child was taking pre-work out.   Labs done at time  -normal   -abnormal labs  Elevated   Elevated non-HDL cholesterol 183  Vit D insufficiency,  low 20   Testosterone low 105, normal Free Testosterone    MyChart message sent 10/24/2023  -recommend vit D supplement 1000 international units daily   -increase fiber in diet and low fat diet    My Chart message sent 11/6/2023  -Total testosterone low   -Recommended stopping over the counter supplements, follow up with Endocrinology.   -patient requested to stop supplements and recheck levels       Repeat levels collected 6/8/2024: reviewed  -cmp normal range  -Free and total testosterone normal   -FSH/LH normal range  -Sex hormone binding globulin low 12 (normal for Ty adjusted age range)    Assessment:   Free and Total Testosterone improved but now with SHBG low.     Recommend again to stay off any supplements for working out.   Repeat levels again in 3 months, if still abnormal will refer to Endocrinology      Bhavana Florence MD

## 2024-07-17 NOTE — PROGRESS NOTES
History of Present Illness:  Date of Surgery: 06/12/24 right shoulder arthroscopy labral repair. Patient a complex tear of the anterior labrum. Basically torn through the inferior aspect of the prior repair.  Overall patient states that shoulder is feeling quite improving well. Numbness in median 3 fingers is improving.     Exam:  Mild effusion  Incisions are clean, dry, intact, and healing well.   Shoulder rotates to neutral  Good range of motion  No calf swelling   Negative Marcel's test  Distal neurovascular exam intact    Assessment:  Patient status post right shoulder revision arthroscopy labral repair    Plan:  Patient begin physical therapy next week. Active/passive motion  Give him a note to return back to work 07/22/24 with 5 pounds maximum lifting no overhead use.  Follow-up in 6 weeks              Scribe Attestation  By signing my name below, IBreanna Scribe   attest that this documentation has been prepared under the direction and in the presence of Booker Lacey MD.

## 2024-07-18 ENCOUNTER — APPOINTMENT (OUTPATIENT)
Dept: ORTHOPEDIC SURGERY | Facility: CLINIC | Age: 19
End: 2024-07-18
Payer: COMMERCIAL

## 2024-07-18 DIAGNOSIS — Z98.890 STATUS POST LABRAL REPAIR OF SHOULDER: Primary | ICD-10-CM

## 2024-07-18 PROCEDURE — 99024 POSTOP FOLLOW-UP VISIT: CPT | Performed by: ORTHOPAEDIC SURGERY

## 2024-07-18 NOTE — LETTER
July 18, 2024     Patient: Merritt Brown   YOB: 2005   Date of Visit: 7/18/2024       To Whom It May Concern:    It is my medical opinion that Merritt Brown may return to work on 7/22/2024 with a maximum of 5 lbs lifting and no overhead lifting .    If you have any questions or concerns, please don't hesitate to call.         Sincerely,        Booker Lacey MD    CC: No Recipients

## 2024-07-19 ENCOUNTER — APPOINTMENT (OUTPATIENT)
Dept: PHYSICAL THERAPY | Facility: CLINIC | Age: 19
End: 2024-07-19
Payer: COMMERCIAL

## 2024-07-25 ENCOUNTER — APPOINTMENT (OUTPATIENT)
Dept: PHYSICAL THERAPY | Facility: HOSPITAL | Age: 19
End: 2024-07-25
Payer: COMMERCIAL

## 2024-07-25 DIAGNOSIS — Z98.890 STATUS POST LABRAL REPAIR OF SHOULDER: ICD-10-CM

## 2024-07-25 DIAGNOSIS — M25.511 CHRONIC RIGHT SHOULDER PAIN: Primary | ICD-10-CM

## 2024-07-25 DIAGNOSIS — G89.29 CHRONIC RIGHT SHOULDER PAIN: Primary | ICD-10-CM

## 2024-07-25 DIAGNOSIS — R29.898 WEAKNESS OF RIGHT SHOULDER: ICD-10-CM

## 2024-07-25 PROCEDURE — 97110 THERAPEUTIC EXERCISES: CPT | Mod: GP | Performed by: PHYSICAL THERAPIST

## 2024-07-25 PROCEDURE — 97161 PT EVAL LOW COMPLEX 20 MIN: CPT | Mod: GP | Performed by: PHYSICAL THERAPIST

## 2024-07-25 ASSESSMENT — PAIN - FUNCTIONAL ASSESSMENT: PAIN_FUNCTIONAL_ASSESSMENT: 0-10

## 2024-07-25 ASSESSMENT — PAIN SCALES - GENERAL: PAINLEVEL_OUTOF10: 0 - NO PAIN

## 2024-07-25 NOTE — PROGRESS NOTES
Physical Therapy    Physical Therapy Evaluation and Treatment      Patient Name: Merritt Brown  MRN: [id number]  Today's Date: 7/25/2024    Time Entry:   Time Calculation  Start Time: 0800  Stop Time: 0840  Time Calculation (min): 40 min  PT Evaluation Time Entry  PT Evaluation (Low) Time Entry: 15  PT Therapeutic Procedures Time Entry  Therapeutic Exercise Time Entry: 25                   Assessment:  This 17 yo pleasant male is s/p Rt shoulder arthroscopic labral repair 6/12/24.  This was a revision secondary to having a repair 1 year ago.  He was wrestling with his brother and dislocated his shoulder and retore his labrum.  He is Rt handed.  No tenderness about his Rt shoulder.  No Rt shoulder pain.  His ROM is good being 6 months post op and 4/5 gross strength with MMT.  Pt instructed to limit his ER for now and take it easy.  No difficulty sleeping.  He currently is off work.  He has restrictions of no overhead use and 5 pounds maximum lifting.  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  No real difficulty with his basic and instrumental ADL's.  Pt given HEP and all questions answered.        Plan:  Continue with skilled PT      Current Problem:   1. Chronic right shoulder pain  Follow Up In Physical Therapy      2. Status post labral repair of shoulder  Referral to Physical Therapy      3. Weakness of right shoulder  Follow Up In Physical Therapy          Subjective  Pt reports his Rt shoulder is feeling good and no pain.     Pain:  Pain Assessment  Pain Assessment: 0-10  0-10 (Numeric) Pain Score: 0 - No pain    Objective   AROM:    Rt shoulder flexion 130, abduction 130, IR 40 and ER 30    Strength: 4/5 gross Rt shoulder strength    Posture:  Mild forward head, protracted scapulae    Palpation:  No tenderness to Rt shoulder    Special Tests:  NT      Outcome Measures:  Other Measures  Disability of Arm Shoulder Hand (DASH): 15.91%     Treatments:    Isometrics (6), pain free, 5 sec  hold, 20 reps *  UBE, L2, F/R, 4 minutes each way  Wall push up plus, 30 reps *  Side lying shoulder 4-way, 30 reps *        Goals:    No Rt shoulder pain.  Rt shoulder AROM WNL's and pain free.  Rt shoulder/scapular strength 5/5 grossly throughout.  Pt able to RTW full time and no pain or difficulty  Independent with HEP.

## 2024-07-30 ENCOUNTER — TREATMENT (OUTPATIENT)
Dept: PHYSICAL THERAPY | Facility: HOSPITAL | Age: 19
End: 2024-07-30
Payer: COMMERCIAL

## 2024-07-30 ENCOUNTER — TELEPHONE (OUTPATIENT)
Dept: PHYSICAL THERAPY | Facility: HOSPITAL | Age: 19
End: 2024-07-30

## 2024-07-30 DIAGNOSIS — R29.898 WEAKNESS OF RIGHT SHOULDER: ICD-10-CM

## 2024-07-30 DIAGNOSIS — G89.29 CHRONIC RIGHT SHOULDER PAIN: Primary | ICD-10-CM

## 2024-07-30 DIAGNOSIS — M25.511 CHRONIC RIGHT SHOULDER PAIN: Primary | ICD-10-CM

## 2024-07-30 DIAGNOSIS — Z98.890 STATUS POST LABRAL REPAIR OF SHOULDER: ICD-10-CM

## 2024-07-30 PROCEDURE — 97110 THERAPEUTIC EXERCISES: CPT | Mod: GP,CQ

## 2024-07-30 ASSESSMENT — PAIN SCALES - GENERAL: PAINLEVEL_OUTOF10: 0 - NO PAIN

## 2024-07-30 ASSESSMENT — PAIN - FUNCTIONAL ASSESSMENT: PAIN_FUNCTIONAL_ASSESSMENT: 0-10

## 2024-07-30 NOTE — PROGRESS NOTES
Physical Therapy Treatment    Patient Name: Merritt Brown  MRN: [id number]  Today's Date: 7/30/2024  Time Calculation  Start Time: 0940  Stop Time: 1013  Time Calculation (min): 33 min    Current Problem  1. Chronic right shoulder pain  Follow Up In Physical Therapy      2. Weakness of right shoulder  Follow Up In Physical Therapy      3. Status post labral repair of shoulder            INSURANCE:  MMO SUPER MED/ ANTHEM 40V PT OT COPAY 0   DED 0 COVERAGE 80/100 OOP [phone]   NO AUTH REQ REF# DHARA M [id number]   [id number]   1-2x 14 PT treatments.  Initially 1x/week for the first month   Visit #:  2/14  Subjective   General  Pt stating his shoulder is doing good no issues.   Precautions  Limit ER, 5# lifting restriction no overhead use  Pain  Pain Assessment: 0-10  0-10 (Numeric) Pain Score: 0 - No pain    Objective   Treatments:  UBE, L2, F/R, 4 minutes each way  Wall push up plus, 30 reps *  Side lying shoulder 4-way, 30 reps *2#  Prone rows/ext 2# 2x15  Ball on the wall 2x10  Theraband rows/ext GTB 2x10  Standing I,Y,T 2x15 1#  Ball on the wall 1.1#3x10  Medball series CP, bus drivers, and figure 8's. 3x10  SA wall slide RTB 2x10  Assessment:  Pt arrived 10 min late to his appointment resulting in shorten treatment session. Added scapular strengthening exercises with good tolerance just noticing some fatigue. Updated HEP and reviewed. Educated pt on normal muscle fatigue.     Plan:  Cont to progress per protocol.     HEP:  Access Code: [email or web address]  URL: [email or web address]  Date: 07/30/2024  Prepared by: Elle Rodriguez    Exercises  - Shoulder extension with resistance - Neutral  - 1 x daily - 7 x weekly - 2 sets - 10 reps  - Standing Shoulder Row with Anchored Resistance  - 1 x daily - 7 x weekly - 2 sets - 10 reps  - Prone Shoulder Row  - 1 x daily - 7 x weekly - 3 sets - 10 reps  - Prone Shoulder Extension - Single Arm  - 1 x daily - 7 x weekly - 3 sets - 10 reps  -  Standing Shoulder Flexion to 90 Degrees with Dumbbells  - 1 x daily - 7 x weekly - 3 sets - 10 reps  - Scaption with Dumbbells  - 1 x daily - 7 x weekly - 3 sets - 10 reps  - Shoulder Abduction with Dumbbells - Thumbs Up  - 1 x daily - 7 x weekly - 3 sets - 10 reps

## 2024-07-30 NOTE — TELEPHONE ENCOUNTER
PC TO PT TO ADV PROVIDER WILL NOT BE IN ON SCHED APPT SET FOR 09/13/24. MOVED APPT TO OPEN SLOT AVAIL 09/12/24 @ 8:45AM; LMOM IN REFERENCE

## 2024-08-06 ENCOUNTER — TREATMENT (OUTPATIENT)
Dept: PHYSICAL THERAPY | Facility: HOSPITAL | Age: 19
End: 2024-08-06
Payer: COMMERCIAL

## 2024-08-06 DIAGNOSIS — R29.898 WEAKNESS OF RIGHT SHOULDER: Primary | ICD-10-CM

## 2024-08-06 DIAGNOSIS — S43.431D TYPE I SLAP LESION, RIGHT, SUBSEQUENT ENCOUNTER: ICD-10-CM

## 2024-08-06 PROCEDURE — 97110 THERAPEUTIC EXERCISES: CPT | Mod: GP | Performed by: PHYSICAL THERAPIST

## 2024-08-06 ASSESSMENT — PAIN SCALES - GENERAL: PAINLEVEL_OUTOF10: 0 - NO PAIN

## 2024-08-06 ASSESSMENT — PAIN - FUNCTIONAL ASSESSMENT: PAIN_FUNCTIONAL_ASSESSMENT: 0-10

## 2024-08-06 NOTE — PROGRESS NOTES
Physical Therapy    Physical Therapy Treatment    Patient Name: Merritt Brown  MRN: [id number]  Today's Date: 8/6/2024    Time Entry:   Time Calculation  Start Time: 1015  Stop Time: 1058  Time Calculation (min): 43 min     PT Therapeutic Procedures Time Entry  Therapeutic Exercise Time Entry: 43                   Assessment:  His Rt shoulder AROM is very good and 60 degrees of ER.  Initiated wall slides with lift in flexion and scaption and prone T's (2) and Y.  Doing light strengthening at the current time and he's having no issues with doing or increased pain.  Overall, he's progressing well being 8 weeks post op.      Plan:  Continue skilled PT once/week.      Current Problem  1. Weakness of right shoulder        2. Type I SLAP lesion, right, subsequent encounter              Subjective  Pt reports his Rt shoulder feels good, no pain.    Visit 3/14    Precautions    Precautions  Precautions Comment: No overhead use and 5 lb lifting restriction.  Follow labral repair protocol    Pain  Pain Assessment  Pain Assessment: 0-10  0-10 (Numeric) Pain Score: 0 - No pain    Objective     Rt shoulder flexion and abduction 160 degrees  IR 40  and ER 60 degrees    Treatments:    UBE, L3, F/R, 4 minutes each way  Wall slides, flexion and scaption with lift, 30 reps *  Wall alphabet, 1 rep  Wall push up plus, 30 reps *  Side lying shoulder 4-way, 30 reps *2#  Prone rows/ext 2# 2x15 *  Prone T's, Y 15 reps x 2 *  3-way ball on wall, 1.1 lb, 30 reps *  Theraband rows/ext GTB, 30 reps *  Standing I,Y,T 2x15 1# *  Ball on the wall 1.1#3x10   Medball series CP, bus drivers, and figure 8's. 3x10  SA wall slide RTB 2x10    Goals:    No Rt shoulder pain.  Rt shoulder AROM WNL's and pain free.  Rt shoulder/scapular strength 5/5 grossly throughout.  Pt able to RTW full time and no pain or difficulty  Independent with HEP.

## 2024-08-08 ENCOUNTER — OFFICE VISIT (OUTPATIENT)
Dept: ORTHOPEDIC SURGERY | Facility: CLINIC | Age: 19
End: 2024-08-08
Payer: COMMERCIAL

## 2024-08-08 DIAGNOSIS — Z98.890 STATUS POST LABRAL REPAIR OF SHOULDER: Primary | ICD-10-CM

## 2024-08-08 PROCEDURE — 1036F TOBACCO NON-USER: CPT | Performed by: ORTHOPAEDIC SURGERY

## 2024-08-08 PROCEDURE — 99024 POSTOP FOLLOW-UP VISIT: CPT | Performed by: ORTHOPAEDIC SURGERY

## 2024-08-08 NOTE — PROGRESS NOTES
History of Present Illness:  Date of Surgery: 06/12/24 right shoulder arthroscopy labral repair. Patient a complex tear of the anterior labrum. Basically torn through the inferior aspect of the prior repair.  Overall patient states that shoulder is feeling quite improving well. Patient wishes to return to work with no restrictions.     Exam:  Mild effusion  Incisions are clean, dry, intact, and healing well.   Shoulder External rotation 25 degrees with a  5/5 strength   Good range of motion  No calf swelling   Negative Marcel's test  Distal neurovascular exam intact    Assessment:  Patient status post right shoulder revision arthroscopy labral repair    Plan:  Patient is going to return to work on Monday the 12th with no restrictions.   Follow-up in 4 weeks            Scribe Attestation  By signing my name below, IBreanna Scribe   attest that this documentation has been prepared under the direction and in the presence of Booker Lacey MD.

## 2024-08-08 NOTE — LETTER
August 8, 2024     Patient: Merritt Brown   YOB: 2005   Date of Visit: 8/8/2024       To Whom It May Concern:    It is my medical opinion that Merritt Brown may return to work on 8/12/2024 with no restrictions .    If you have any questions or concerns, please don't hesitate to call.         Sincerely,        Booker Lacey MD    CC: No Recipients

## 2024-08-09 ENCOUNTER — APPOINTMENT (OUTPATIENT)
Dept: PHYSICAL THERAPY | Facility: HOSPITAL | Age: 19
End: 2024-08-09
Payer: COMMERCIAL

## 2024-08-15 ENCOUNTER — TELEPHONE (OUTPATIENT)
Dept: PHYSICAL THERAPY | Facility: HOSPITAL | Age: 19
End: 2024-08-15
Payer: COMMERCIAL

## 2024-08-15 NOTE — TELEPHONE ENCOUNTER
RCVD CALL FROM MOTHER-ANEESH RE: TRYING TO LOCATE ANOTHER FACILITY WHERE HE CAN HAVE LATE APPTS. SHE STATED THAT [facility]. SHE CONFIRMED THAT SHE CANCELLED HIS APPT FOR 08/22/24 DUE TO FACT THAT HE CANNOT MAKE IT. SHE DID NOT WISH TO CANCEL ANY ANOTHER APPTS AT THIS TIME AND INFORMED THAT IF THEY CHOOSE TO GO ELSEWHERE, SHE WILL CALL TO CANCEL REMAINING VISITS.

## 2024-08-21 ENCOUNTER — APPOINTMENT (OUTPATIENT)
Dept: PHYSICAL THERAPY | Facility: HOSPITAL | Age: 19
End: 2024-08-21
Payer: COMMERCIAL

## 2024-08-22 ENCOUNTER — APPOINTMENT (OUTPATIENT)
Dept: PHYSICAL THERAPY | Facility: HOSPITAL | Age: 19
End: 2024-08-22
Payer: COMMERCIAL

## 2024-08-26 ENCOUNTER — APPOINTMENT (OUTPATIENT)
Dept: PHYSICAL THERAPY | Facility: HOSPITAL | Age: 19
End: 2024-08-26
Payer: COMMERCIAL

## 2024-09-03 ENCOUNTER — TELEPHONE (OUTPATIENT)
Dept: PHYSICAL THERAPY | Facility: HOSPITAL | Age: 19
End: 2024-09-03
Payer: COMMERCIAL

## 2024-09-03 NOTE — PROGRESS NOTES
History of Present Illness:  Date of Surgery: 06/12/24 right shoulder arthroscopy labral repair. Patient a complex tear of the anterior labrum. Basically torn through the inferior aspect of the prior repair.  Overall patient states that shoulder is feeling quite improving well. Patient wishes to return to work with no restrictions.     Exam:  Mild effusion  Incisions are clean, dry, intact, and healing well.   Shoulder External rotation 25 degrees with a  5/5 strength   Good range of motion  No calf swelling   Negative Marcel's test  Distal neurovascular exam intact    Assessment:  Patient status post right shoulder revision arthroscopy labral repair    Plan:  Patient is going to return to work on Monday the 12th with no restrictions.   Follow-up in 4 weeks    Past Medical History:   Diagnosis Date    Achilles tendinitis, unspecified leg 03/21/2018    Achilles tendinitis    Acute upper respiratory infection, unspecified 04/11/2019    Acute upper respiratory infection    Allergic contact dermatitis due to plants, except food 08/05/2015    Contact dermatitis due to poison ivy    Body mass index (BMI) pediatric, 5th percentile to less than 85th percentile for age 10/16/2019    BMI (body mass index), pediatric, 5% to less than 85% for age    Encounter for immunization 11/20/2020    Encounter for administration of vaccine    Encounter for routine child health examination without abnormal findings 10/17/2019    Encounter for routine child health examination without abnormal findings    Encounter for routine child health examination without abnormal findings 11/17/2017    Encounter for routine child health examination w/o abnormal findings    Hypoglycemia 10/04/2023    Iliotibial band syndrome, unspecified leg 03/17/2017    Iliotibial band syndrome    Immunization not carried out because of patient decision for unspecified reason 10/16/2019    Immunization not carried out because of patient decision    Other acute  nonsuppurative otitis media, unspecified ear 08/05/2015    Acute nonsuppurative otitis media    Other conditions influencing health status 02/22/2017    History of cough    Other infective otitis externa, unspecified ear 07/29/2013    Infective otitis externa    Otitis media, unspecified, left ear 04/11/2019    Acute left otitis media    Otitis media, unspecified, unspecified ear 10/16/2019    Recurrent acute otitis media    Overweight 11/10/2017    Overweight, pediatric, BMI 85.0-94.9 percentile for age    Pain in left hip 03/17/2017    Left hip pain in pediatric patient    Pain in unspecified ankle and joints of unspecified foot 03/21/2018    Ankle pain    Personal history of diseases of the skin and subcutaneous tissue 11/18/2021    History of cellulitis    Personal history of diseases of the skin and subcutaneous tissue 09/21/2016    History of impetigo    Personal history of diseases of the skin and subcutaneous tissue 07/24/2015    History of sunburn    Personal history of other diseases of the nervous system and sense organs 02/22/2017    History of acute otitis media    Personal history of other diseases of the nervous system and sense organs 07/29/2013    History of acute otitis media    Personal history of other diseases of the respiratory system 02/22/2017    History of acute pharyngitis    Personal history of other diseases of the respiratory system 11/03/2021    History of acute pharyngitis    Personal history of other diseases of the respiratory system 02/18/2014    History of upper respiratory infection    Personal history of other drug therapy 11/10/2017    History of influenza vaccination    Personal history of other infectious and parasitic diseases 09/21/2016    History of viral exanthem    Rash and other nonspecific skin eruption 09/21/2016    Rash    Recurrent dislocation of right shoulder     Unspecified injury of left foot, initial encounter 02/19/2018    Injury of small toe, left, initial  encounter    Unspecified injury of unspecified ankle, initial encounter 03/21/2018    Ankle injury       Medication Documentation Review Audit       Reviewed by Melissa Brunner, MA (Medical Assistant) on 08/08/24 at 0800      Medication Order Taking? Sig Documenting Provider Last Dose Status            No Medications to Display                                   No Known Allergies    Social History     Socioeconomic History    Marital status: Single     Spouse name: Not on file    Number of children: Not on file    Years of education: Not on file    Highest education level: Not on file   Occupational History    Not on file   Tobacco Use    Smoking status: Never     Passive exposure: Never    Smokeless tobacco: Never   Vaping Use    Vaping status: Never Used   Substance and Sexual Activity    Alcohol use: Never    Drug use: Never    Sexual activity: Defer   Other Topics Concern    Not on file   Social History Narrative    Not on file     Social Determinants of Health     Financial Resource Strain: Not on file   Food Insecurity: Not on file   Transportation Needs: Not on file   Physical Activity: Not on file   Stress: Not on file   Social Connections: Not on file   Intimate Partner Violence: Not on file   Housing Stability: Not on file       Past Surgical History:   Procedure Laterality Date    SHOULDER ARTHROSCOPY W/ LABRAL REPAIR Right 06/2023    TONSILLECTOMY  04/24/2013    Tonsillectomy With Adenoidectomy    TYMPANOSTOMY TUBE PLACEMENT  04/24/2013    Ear Pressure Equalization Tube           Scribe Attestation  By signing my name below, IBreanna Scribe   attest that this documentation has been prepared under the direction and in the presence of Booker Lacey MD.

## 2024-09-04 ENCOUNTER — APPOINTMENT (OUTPATIENT)
Dept: PHYSICAL THERAPY | Facility: HOSPITAL | Age: 19
End: 2024-09-04
Payer: COMMERCIAL

## 2024-09-05 NOTE — PROGRESS NOTES
History of Present Illness:  Date of Surgery: 06/12/24 right shoulder arthroscopy labral repair. Patient a complex tear of the anterior labrum. Basically torn through the inferior aspect of the prior repair.  He is doing well and reports that he has not been doing any heavy lifting at work.     Exam:  Mild effusion  Incisions healed  Shoulder External rotation 40 degrees with a  5/5 strength   Good range of motion  No calf swelling   Negative Marcel's test  Distal neurovascular exam intact    Assessment:  Right shoulder revision arthroscopy bankart repair    Plan:  Explained to him to slow down in terms of daily use and activities in terms of strength and weight.   Follow-up in 6 weeks with x-rays 2 view shoulder.     Past Medical History:   Diagnosis Date    Achilles tendinitis, unspecified leg 03/21/2018    Achilles tendinitis    Acute upper respiratory infection, unspecified 04/11/2019    Acute upper respiratory infection    Allergic contact dermatitis due to plants, except food 08/05/2015    Contact dermatitis due to poison ivy    Body mass index (BMI) pediatric, 5th percentile to less than 85th percentile for age 10/16/2019    BMI (body mass index), pediatric, 5% to less than 85% for age    Encounter for immunization 11/20/2020    Encounter for administration of vaccine    Encounter for routine child health examination without abnormal findings 10/17/2019    Encounter for routine child health examination without abnormal findings    Encounter for routine child health examination without abnormal findings 11/17/2017    Encounter for routine child health examination w/o abnormal findings    Hypoglycemia 10/04/2023    Iliotibial band syndrome, unspecified leg 03/17/2017    Iliotibial band syndrome    Immunization not carried out because of patient decision for unspecified reason 10/16/2019    Immunization not carried out because of patient decision    Other acute nonsuppurative otitis media, unspecified ear  08/05/2015    Acute nonsuppurative otitis media    Other conditions influencing health status 02/22/2017    History of cough    Other infective otitis externa, unspecified ear 07/29/2013    Infective otitis externa    Otitis media, unspecified, left ear 04/11/2019    Acute left otitis media    Otitis media, unspecified, unspecified ear 10/16/2019    Recurrent acute otitis media    Overweight 11/10/2017    Overweight, pediatric, BMI 85.0-94.9 percentile for age    Pain in left hip 03/17/2017    Left hip pain in pediatric patient    Pain in unspecified ankle and joints of unspecified foot 03/21/2018    Ankle pain    Personal history of diseases of the skin and subcutaneous tissue 11/18/2021    History of cellulitis    Personal history of diseases of the skin and subcutaneous tissue 09/21/2016    History of impetigo    Personal history of diseases of the skin and subcutaneous tissue 07/24/2015    History of sunburn    Personal history of other diseases of the nervous system and sense organs 02/22/2017    History of acute otitis media    Personal history of other diseases of the nervous system and sense organs 07/29/2013    History of acute otitis media    Personal history of other diseases of the respiratory system 02/22/2017    History of acute pharyngitis    Personal history of other diseases of the respiratory system 11/03/2021    History of acute pharyngitis    Personal history of other diseases of the respiratory system 02/18/2014    History of upper respiratory infection    Personal history of other drug therapy 11/10/2017    History of influenza vaccination    Personal history of other infectious and parasitic diseases 09/21/2016    History of viral exanthem    Rash and other nonspecific skin eruption 09/21/2016    Rash    Recurrent dislocation of right shoulder     Unspecified injury of left foot, initial encounter 02/19/2018    Injury of small toe, left, initial encounter    Unspecified injury of unspecified  ankle, initial encounter 03/21/2018    Ankle injury       Medication Documentation Review Audit       Reviewed by Melissa Brunner, MA (Medical Assistant) on 08/08/24 at 0800      Medication Order Taking? Sig Documenting Provider Last Dose Status            No Medications to Display                                   No Known Allergies    Social History     Socioeconomic History    Marital status: Single     Spouse name: Not on file    Number of children: Not on file    Years of education: Not on file    Highest education level: Not on file   Occupational History    Not on file   Tobacco Use    Smoking status: Never     Passive exposure: Never    Smokeless tobacco: Never   Vaping Use    Vaping status: Never Used   Substance and Sexual Activity    Alcohol use: Never    Drug use: Never    Sexual activity: Defer   Other Topics Concern    Not on file   Social History Narrative    Not on file     Social Determinants of Health     Financial Resource Strain: Not on file   Food Insecurity: Not on file   Transportation Needs: Not on file   Physical Activity: Not on file   Stress: Not on file   Social Connections: Not on file   Intimate Partner Violence: Not on file   Housing Stability: Not on file       Past Surgical History:   Procedure Laterality Date    SHOULDER ARTHROSCOPY W/ LABRAL REPAIR Right 06/2023    TONSILLECTOMY  04/24/2013    Tonsillectomy With Adenoidectomy    TYMPANOSTOMY TUBE PLACEMENT  04/24/2013    Ear Pressure Equalization Tube           Scribe Attestation  By signing my name below, IBreanna Scribhonorio   attest that this documentation has been prepared under the direction and in the presence of Booker Lacey MD.

## 2024-09-06 ENCOUNTER — APPOINTMENT (OUTPATIENT)
Dept: ORTHOPEDIC SURGERY | Facility: CLINIC | Age: 19
End: 2024-09-06
Payer: COMMERCIAL

## 2024-09-06 ENCOUNTER — OFFICE VISIT (OUTPATIENT)
Dept: ORTHOPEDIC SURGERY | Facility: CLINIC | Age: 19
End: 2024-09-06
Payer: COMMERCIAL

## 2024-09-06 ENCOUNTER — APPOINTMENT (OUTPATIENT)
Dept: PHYSICAL THERAPY | Facility: HOSPITAL | Age: 19
End: 2024-09-06
Payer: COMMERCIAL

## 2024-09-06 DIAGNOSIS — Z98.890 STATUS POST LABRAL REPAIR OF SHOULDER: Primary | ICD-10-CM

## 2024-09-06 PROCEDURE — 99211 OFF/OP EST MAY X REQ PHY/QHP: CPT | Performed by: ORTHOPAEDIC SURGERY

## 2024-09-09 ENCOUNTER — TELEPHONE (OUTPATIENT)
Dept: PHYSICAL THERAPY | Facility: HOSPITAL | Age: 19
End: 2024-09-09
Payer: COMMERCIAL

## 2024-09-09 NOTE — TELEPHONE ENCOUNTER
[facility] APPT CANC NOTICE FOR ALL APPTS SCHED 09/11-10/04. PER MOTHER-ANEESH PT IS GOING TO [facility] DUE TO [facility] UNABLE TO PROVIDE LATE HOURS FOR PC SESSIONS. NO FURTHER ACTION REQUIRED

## 2024-09-11 ENCOUNTER — APPOINTMENT (OUTPATIENT)
Dept: PHYSICAL THERAPY | Facility: HOSPITAL | Age: 19
End: 2024-09-11
Payer: COMMERCIAL

## 2024-09-12 ENCOUNTER — APPOINTMENT (OUTPATIENT)
Dept: PHYSICAL THERAPY | Facility: HOSPITAL | Age: 19
End: 2024-09-12
Payer: COMMERCIAL

## 2024-09-13 ENCOUNTER — APPOINTMENT (OUTPATIENT)
Dept: PHYSICAL THERAPY | Facility: HOSPITAL | Age: 19
End: 2024-09-13
Payer: COMMERCIAL

## 2024-09-18 ENCOUNTER — APPOINTMENT (OUTPATIENT)
Dept: PHYSICAL THERAPY | Facility: HOSPITAL | Age: 19
End: 2024-09-18
Payer: COMMERCIAL

## 2024-09-20 ENCOUNTER — APPOINTMENT (OUTPATIENT)
Dept: PHYSICAL THERAPY | Facility: HOSPITAL | Age: 19
End: 2024-09-20
Payer: COMMERCIAL

## 2024-09-24 ENCOUNTER — OFFICE VISIT (OUTPATIENT)
Dept: ORTHOPEDIC SURGERY | Facility: CLINIC | Age: 19
End: 2024-09-24
Payer: COMMERCIAL

## 2024-09-24 ENCOUNTER — HOSPITAL ENCOUNTER (OUTPATIENT)
Dept: RADIOLOGY | Facility: CLINIC | Age: 19
Discharge: HOME | End: 2024-09-24
Payer: COMMERCIAL

## 2024-09-24 DIAGNOSIS — Z98.890 STATUS POST LABRAL REPAIR OF SHOULDER: ICD-10-CM

## 2024-09-24 PROCEDURE — 99213 OFFICE O/P EST LOW 20 MIN: CPT | Performed by: ORTHOPAEDIC SURGERY

## 2024-09-24 PROCEDURE — 73030 X-RAY EXAM OF SHOULDER: CPT | Mod: RIGHT SIDE | Performed by: ORTHOPAEDIC SURGERY

## 2024-09-24 PROCEDURE — 73030 X-RAY EXAM OF SHOULDER: CPT | Mod: RT

## 2024-09-24 PROCEDURE — 99214 OFFICE O/P EST MOD 30 MIN: CPT | Performed by: ORTHOPAEDIC SURGERY

## 2024-09-24 RX ORDER — IBUPROFEN 800 MG/1
800 TABLET ORAL EVERY 8 HOURS PRN
Qty: 30 TABLET | Refills: 0 | Status: SHIPPED | OUTPATIENT
Start: 2024-09-24 | End: 2024-10-04

## 2024-09-24 NOTE — PROGRESS NOTES
History of Present Illness:  Date of Surgery: 06/12/24 right shoulder arthroscopy labral repair. Patient a complex tear of the anterior labrum. Basically torn through the inferior aspect of the prior repair.  He is here today because he strained the shoulder while at work, states he was going to put something down with started to fall and he reached to grab it and irritated the shoulder.  Patient states since then it has been more sore than usual.  Otherwise patient states prior to this incident the shoulder was doing relatively well a little sore and achy with work but continuing to improve.    Imaging:  Xrays right shoulder: No acute fracture or dislocation. No arthritic changes.    Exam:  Mild effusion  Incisions healed  Shoulder External rotation 30 degrees with a  5/5 strength . Abduction to 90.  Good range of motion  No calf swelling   Negative Marcel's test  Distal neurovascular exam intact    Assessment:  Right shoulder revision arthroscopy bankart repair    Plan:  Reassurance. He did have a small injury this morning, but it seems that there is not any structural injury.   Ibuprofen and Ice as needed.   Continue working with therapy.   Follow-up in 4 weeks with repeat x-rays.     Past Medical History:   Diagnosis Date    Achilles tendinitis, unspecified leg 03/21/2018    Achilles tendinitis    Acute upper respiratory infection, unspecified 04/11/2019    Acute upper respiratory infection    Allergic contact dermatitis due to plants, except food 08/05/2015    Contact dermatitis due to poison ivy    Body mass index (BMI) pediatric, 5th percentile to less than 85th percentile for age 10/16/2019    BMI (body mass index), pediatric, 5% to less than 85% for age    Encounter for immunization 11/20/2020    Encounter for administration of vaccine    Encounter for routine child health examination without abnormal findings 10/17/2019    Encounter for routine child health examination without abnormal findings     Encounter for routine child health examination without abnormal findings 11/17/2017    Encounter for routine child health examination w/o abnormal findings    Hypoglycemia 10/04/2023    Iliotibial band syndrome, unspecified leg 03/17/2017    Iliotibial band syndrome    Immunization not carried out because of patient decision for unspecified reason 10/16/2019    Immunization not carried out because of patient decision    Other acute nonsuppurative otitis media, unspecified ear 08/05/2015    Acute nonsuppurative otitis media    Other conditions influencing health status 02/22/2017    History of cough    Other infective otitis externa, unspecified ear 07/29/2013    Infective otitis externa    Otitis media, unspecified, left ear 04/11/2019    Acute left otitis media    Otitis media, unspecified, unspecified ear 10/16/2019    Recurrent acute otitis media    Overweight 11/10/2017    Overweight, pediatric, BMI 85.0-94.9 percentile for age    Pain in left hip 03/17/2017    Left hip pain in pediatric patient    Pain in unspecified ankle and joints of unspecified foot 03/21/2018    Ankle pain    Personal history of diseases of the skin and subcutaneous tissue 11/18/2021    History of cellulitis    Personal history of diseases of the skin and subcutaneous tissue 09/21/2016    History of impetigo    Personal history of diseases of the skin and subcutaneous tissue 07/24/2015    History of sunburn    Personal history of other diseases of the nervous system and sense organs 02/22/2017    History of acute otitis media    Personal history of other diseases of the nervous system and sense organs 07/29/2013    History of acute otitis media    Personal history of other diseases of the respiratory system 02/22/2017    History of acute pharyngitis    Personal history of other diseases of the respiratory system 11/03/2021    History of acute pharyngitis    Personal history of other diseases of the respiratory system 02/18/2014    History  of upper respiratory infection    Personal history of other drug therapy 11/10/2017    History of influenza vaccination    Personal history of other infectious and parasitic diseases 09/21/2016    History of viral exanthem    Rash and other nonspecific skin eruption 09/21/2016    Rash    Recurrent dislocation of right shoulder     Unspecified injury of left foot, initial encounter 02/19/2018    Injury of small toe, left, initial encounter    Unspecified injury of unspecified ankle, initial encounter 03/21/2018    Ankle injury       Medication Documentation Review Audit       Reviewed by Alanna Rios MA (Medical Assistant) on 09/06/24 at 0756      Medication Order Taking? Sig Documenting Provider Last Dose Status            No Medications to Display                                   No Known Allergies    Social History     Socioeconomic History    Marital status: Single     Spouse name: Not on file    Number of children: Not on file    Years of education: Not on file    Highest education level: Not on file   Occupational History    Not on file   Tobacco Use    Smoking status: Never     Passive exposure: Never    Smokeless tobacco: Never   Vaping Use    Vaping status: Never Used   Substance and Sexual Activity    Alcohol use: Never    Drug use: Never    Sexual activity: Defer   Other Topics Concern    Not on file   Social History Narrative    Not on file     Social Determinants of Health     Financial Resource Strain: Not on file   Food Insecurity: Not on file   Transportation Needs: Not on file   Physical Activity: Not on file   Stress: Not on file   Social Connections: Not on file   Intimate Partner Violence: Not on file   Housing Stability: Not on file       Past Surgical History:   Procedure Laterality Date    SHOULDER ARTHROSCOPY W/ LABRAL REPAIR Right 06/2023    TONSILLECTOMY  04/24/2013    Tonsillectomy With Adenoidectomy    TYMPANOSTOMY TUBE PLACEMENT  04/24/2013    Ear Pressure Equalization Tube            Scribe Attestation  By signing my name below, I, Flores Aldridge   attest that this documentation has been prepared under the direction and in the presence of Booker Lacey MD.

## 2024-09-25 ENCOUNTER — APPOINTMENT (OUTPATIENT)
Dept: PHYSICAL THERAPY | Facility: HOSPITAL | Age: 19
End: 2024-09-25
Payer: COMMERCIAL

## 2024-09-27 ENCOUNTER — APPOINTMENT (OUTPATIENT)
Dept: PHYSICAL THERAPY | Facility: HOSPITAL | Age: 19
End: 2024-09-27
Payer: COMMERCIAL

## 2024-10-02 ENCOUNTER — APPOINTMENT (OUTPATIENT)
Dept: PHYSICAL THERAPY | Facility: HOSPITAL | Age: 19
End: 2024-10-02
Payer: COMMERCIAL

## 2024-10-04 ENCOUNTER — APPOINTMENT (OUTPATIENT)
Dept: PHYSICAL THERAPY | Facility: HOSPITAL | Age: 19
End: 2024-10-04
Payer: COMMERCIAL

## 2024-10-16 ENCOUNTER — OFFICE VISIT (OUTPATIENT)
Dept: ORTHOPEDIC SURGERY | Facility: CLINIC | Age: 19
End: 2024-10-16
Payer: COMMERCIAL

## 2024-10-16 DIAGNOSIS — Z98.890 STATUS POST LABRAL REPAIR OF SHOULDER: Primary | ICD-10-CM

## 2024-10-16 PROCEDURE — 1036F TOBACCO NON-USER: CPT | Performed by: ORTHOPAEDIC SURGERY

## 2024-10-16 PROCEDURE — 99214 OFFICE O/P EST MOD 30 MIN: CPT | Performed by: ORTHOPAEDIC SURGERY

## 2024-10-16 PROCEDURE — 99213 OFFICE O/P EST LOW 20 MIN: CPT | Performed by: ORTHOPAEDIC SURGERY
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
## 2024-10-25 ENCOUNTER — APPOINTMENT (OUTPATIENT)
Dept: ORTHOPEDIC SURGERY | Facility: CLINIC | Age: 19
End: 2024-10-25
Payer: COMMERCIAL

## 2024-11-07 NOTE — PROGRESS NOTES
History of Present Illness:  Date of Surgery: 06/12/24 right shoulder arthroscopy labral repair. Patient a complex tear of the anterior labrum. He saw Dr. Barba on 10/16/24 for his shoulder.    Imaging:  Xrays right shoulder: No acute fracture or dislocation. No arthritic changes.    Exam:  Mild effusion  Incisions healed  Shoulder External rotation 30 degrees with a  5/5 strength . Abduction to 90.  Good range of motion  No calf swelling   Negative Marcel's test  Distal neurovascular exam intact    Assessment:  Right shoulder revision arthroscopy bankart repair    Plan:  Reassurance. He did have a small injury this morning, but it seems that there is not any structural injury.   Ibuprofen and Ice as needed.   Continue working with therapy.   Follow-up in 4 weeks with repeat x-rays.     Past Medical History:   Diagnosis Date    Achilles tendinitis, unspecified leg 03/21/2018    Achilles tendinitis    Acute upper respiratory infection, unspecified 04/11/2019    Acute upper respiratory infection    Allergic contact dermatitis due to plants, except food 08/05/2015    Contact dermatitis due to poison ivy    Body mass index (BMI) pediatric, 5th percentile to less than 85th percentile for age 10/16/2019    BMI (body mass index), pediatric, 5% to less than 85% for age    Encounter for immunization 11/20/2020    Encounter for administration of vaccine    Encounter for routine child health examination without abnormal findings 10/17/2019    Encounter for routine child health examination without abnormal findings    Encounter for routine child health examination without abnormal findings 11/17/2017    Encounter for routine child health examination w/o abnormal findings    Hypoglycemia 10/04/2023    Iliotibial band syndrome, unspecified leg 03/17/2017    Iliotibial band syndrome    Immunization not carried out because of patient decision for unspecified reason 10/16/2019    Immunization not carried out because of patient  decision    Other acute nonsuppurative otitis media, unspecified ear 08/05/2015    Acute nonsuppurative otitis media    Other conditions influencing health status 02/22/2017    History of cough    Other infective otitis externa, unspecified ear 07/29/2013    Infective otitis externa    Otitis media, unspecified, left ear 04/11/2019    Acute left otitis media    Otitis media, unspecified, unspecified ear 10/16/2019    Recurrent acute otitis media    Overweight 11/10/2017    Overweight, pediatric, BMI 85.0-94.9 percentile for age    Pain in left hip 03/17/2017    Left hip pain in pediatric patient    Pain in unspecified ankle and joints of unspecified foot 03/21/2018    Ankle pain    Personal history of diseases of the skin and subcutaneous tissue 11/18/2021    History of cellulitis    Personal history of diseases of the skin and subcutaneous tissue 09/21/2016    History of impetigo    Personal history of diseases of the skin and subcutaneous tissue 07/24/2015    History of sunburn    Personal history of other diseases of the nervous system and sense organs 02/22/2017    History of acute otitis media    Personal history of other diseases of the nervous system and sense organs 07/29/2013    History of acute otitis media    Personal history of other diseases of the respiratory system 02/22/2017    History of acute pharyngitis    Personal history of other diseases of the respiratory system 11/03/2021    History of acute pharyngitis    Personal history of other diseases of the respiratory system 02/18/2014    History of upper respiratory infection    Personal history of other drug therapy 11/10/2017    History of influenza vaccination    Personal history of other infectious and parasitic diseases 09/21/2016    History of viral exanthem    Rash and other nonspecific skin eruption 09/21/2016    Rash    Recurrent dislocation of right shoulder     Unspecified injury of left foot, initial encounter 02/19/2018    Injury of  small toe, left, initial encounter    Unspecified injury of unspecified ankle, initial encounter 03/21/2018    Ankle injury       Medication Documentation Review Audit       Reviewed by Maria G Srivastava MA (Medical Assistant) on 10/16/24 at 1522      Medication Order Taking? Sig Documenting Provider Last Dose Status            No Medications to Display                                   No Known Allergies    Social History     Socioeconomic History    Marital status: Single     Spouse name: Not on file    Number of children: Not on file    Years of education: Not on file    Highest education level: Not on file   Occupational History    Not on file   Tobacco Use    Smoking status: Never     Passive exposure: Never    Smokeless tobacco: Never   Vaping Use    Vaping status: Never Used   Substance and Sexual Activity    Alcohol use: Never    Drug use: Never    Sexual activity: Defer   Other Topics Concern    Not on file   Social History Narrative    Not on file     Social Drivers of Health     Financial Resource Strain: Not on file   Food Insecurity: Not on file   Transportation Needs: Not on file   Physical Activity: Not on file   Stress: Not on file   Social Connections: Not on file   Intimate Partner Violence: Not on file   Housing Stability: Not on file       Past Surgical History:   Procedure Laterality Date    SHOULDER ARTHROSCOPY W/ LABRAL REPAIR Right 06/2023    TONSILLECTOMY  04/24/2013    Tonsillectomy With Adenoidectomy    TYMPANOSTOMY TUBE PLACEMENT  04/24/2013    Ear Pressure Equalization Tube           Scribe Attestation  By signing my name below, IBreanna Scribe   attest that this documentation has been prepared under the direction and in the presence of Booker Lacey MD.

## 2024-11-08 ENCOUNTER — APPOINTMENT (OUTPATIENT)
Dept: ORTHOPEDIC SURGERY | Facility: CLINIC | Age: 19
End: 2024-11-08
Payer: COMMERCIAL

## 2024-11-13 NOTE — PROGRESS NOTES
History of Present Illness:  Date of Surgery: 06/12/24 right shoulder arthroscopy labral repair. Patient a complex tear of the anterior labrum. He saw Dr. Barba on 10/16/24 for his shoulder. His shoulder is doing well and showing improvements. He does still have some stiffness.    Imaging:  Xrays right shoulder: No acute fracture or dislocation. No arthritic changes.    Exam:  Mild effusion  Incisions healed  Shoulder External rotation 40 degrees compared to 50 on the right. 5/5 rotator cuff strength.   Good range of motion  No calf swelling   Negative Marcel's test  Distal neurovascular exam intact    Assessment:  Right shoulder revision arthroscopy bankart repair    Plan:  Continue working with therapy.  Ibuprofen and Ice as needed.   Follow-up in 2-3 months.      Past Medical History:   Diagnosis Date    Achilles tendinitis, unspecified leg 03/21/2018    Achilles tendinitis    Acute upper respiratory infection, unspecified 04/11/2019    Acute upper respiratory infection    Allergic contact dermatitis due to plants, except food 08/05/2015    Contact dermatitis due to poison ivy    Body mass index (BMI) pediatric, 5th percentile to less than 85th percentile for age 10/16/2019    BMI (body mass index), pediatric, 5% to less than 85% for age    Encounter for immunization 11/20/2020    Encounter for administration of vaccine    Encounter for routine child health examination without abnormal findings 10/17/2019    Encounter for routine child health examination without abnormal findings    Encounter for routine child health examination without abnormal findings 11/17/2017    Encounter for routine child health examination w/o abnormal findings    Hypoglycemia 10/04/2023    Iliotibial band syndrome, unspecified leg 03/17/2017    Iliotibial band syndrome    Immunization not carried out because of patient decision for unspecified reason 10/16/2019    Immunization not carried out because of patient decision    Other acute  nonsuppurative otitis media, unspecified ear 08/05/2015    Acute nonsuppurative otitis media    Other conditions influencing health status 02/22/2017    History of cough    Other infective otitis externa, unspecified ear 07/29/2013    Infective otitis externa    Otitis media, unspecified, left ear 04/11/2019    Acute left otitis media    Otitis media, unspecified, unspecified ear 10/16/2019    Recurrent acute otitis media    Overweight 11/10/2017    Overweight, pediatric, BMI 85.0-94.9 percentile for age    Pain in left hip 03/17/2017    Left hip pain in pediatric patient    Pain in unspecified ankle and joints of unspecified foot 03/21/2018    Ankle pain    Personal history of diseases of the skin and subcutaneous tissue 11/18/2021    History of cellulitis    Personal history of diseases of the skin and subcutaneous tissue 09/21/2016    History of impetigo    Personal history of diseases of the skin and subcutaneous tissue 07/24/2015    History of sunburn    Personal history of other diseases of the nervous system and sense organs 02/22/2017    History of acute otitis media    Personal history of other diseases of the nervous system and sense organs 07/29/2013    History of acute otitis media    Personal history of other diseases of the respiratory system 02/22/2017    History of acute pharyngitis    Personal history of other diseases of the respiratory system 11/03/2021    History of acute pharyngitis    Personal history of other diseases of the respiratory system 02/18/2014    History of upper respiratory infection    Personal history of other drug therapy 11/10/2017    History of influenza vaccination    Personal history of other infectious and parasitic diseases 09/21/2016    History of viral exanthem    Rash and other nonspecific skin eruption 09/21/2016    Rash    Recurrent dislocation of right shoulder     Unspecified injury of left foot, initial encounter 02/19/2018    Injury of small toe, left, initial  encounter    Unspecified injury of unspecified ankle, initial encounter 03/21/2018    Ankle injury       Medication Documentation Review Audit       Reviewed by Maria G Srivastava MA (Medical Assistant) on 10/16/24 at 1522      Medication Order Taking? Sig Documenting Provider Last Dose Status            No Medications to Display                                   No Known Allergies    Social History     Socioeconomic History    Marital status: Single     Spouse name: Not on file    Number of children: Not on file    Years of education: Not on file    Highest education level: Not on file   Occupational History    Not on file   Tobacco Use    Smoking status: Never     Passive exposure: Never    Smokeless tobacco: Never   Vaping Use    Vaping status: Never Used   Substance and Sexual Activity    Alcohol use: Never    Drug use: Never    Sexual activity: Defer   Other Topics Concern    Not on file   Social History Narrative    Not on file     Social Drivers of Health     Financial Resource Strain: Not on file   Food Insecurity: Not on file   Transportation Needs: Not on file   Physical Activity: Not on file   Stress: Not on file   Social Connections: Not on file   Intimate Partner Violence: Not on file   Housing Stability: Not on file       Past Surgical History:   Procedure Laterality Date    SHOULDER ARTHROSCOPY W/ LABRAL REPAIR Right 06/2023    TONSILLECTOMY  04/24/2013    Tonsillectomy With Adenoidectomy    TYMPANOSTOMY TUBE PLACEMENT  04/24/2013    Ear Pressure Equalization Tube           Scribe Attestation  By signing my name below, IBreanna Scribe   attest that this documentation has been prepared under the direction and in the presence of Booker Lacey MD.

## 2024-11-14 ENCOUNTER — OFFICE VISIT (OUTPATIENT)
Dept: ORTHOPEDIC SURGERY | Facility: CLINIC | Age: 19
End: 2024-11-14
Payer: COMMERCIAL

## 2024-11-14 ENCOUNTER — HOSPITAL ENCOUNTER (OUTPATIENT)
Dept: RADIOLOGY | Facility: HOSPITAL | Age: 19
Discharge: HOME | End: 2024-11-14
Payer: COMMERCIAL

## 2024-11-14 DIAGNOSIS — Z98.890 STATUS POST LABRAL REPAIR OF SHOULDER: ICD-10-CM

## 2024-11-14 PROCEDURE — 73030 X-RAY EXAM OF SHOULDER: CPT | Mod: RT

## 2024-11-14 PROCEDURE — 99213 OFFICE O/P EST LOW 20 MIN: CPT | Performed by: ORTHOPAEDIC SURGERY

## 2024-11-15 ENCOUNTER — APPOINTMENT (OUTPATIENT)
Dept: ORTHOPEDIC SURGERY | Facility: CLINIC | Age: 19
End: 2024-11-15
Payer: COMMERCIAL

## 2024-12-20 ENCOUNTER — APPOINTMENT (OUTPATIENT)
Dept: PEDIATRICS | Facility: CLINIC | Age: 19
End: 2024-12-20
Payer: COMMERCIAL

## 2025-01-27 NOTE — PROGRESS NOTES
History of Present Illness:  Date of Surgery: 06/12/24 right shoulder arthroscopy labral repair. Overall his shoulder is doing well but he does have some continued soreness and stiffness with specific movements above waist level. It is fine at waist level.     Imaging:  Xrays right shoulder: No acute fracture or dislocation. No arthritic changes.    Exam:  Mild effusion  Incisions healed  Shoulder External rotation 40 degrees compared to 50 on the right. 5/5 rotator cuff strength.   Forward flexion to 120  Minor pain with impingement  Good range of motion    Assessment:  Right shoulder revision arthroscopy bankart repair    Plan:  Continue working on strength and stability.  Ibuprofen and Ice as needed.   Follow-up in 2 months with x-ray 2-view shoulder at next visit.      Past Medical History:   Diagnosis Date    Achilles tendinitis, unspecified leg 03/21/2018    Achilles tendinitis    Acute upper respiratory infection, unspecified 04/11/2019    Acute upper respiratory infection    Allergic contact dermatitis due to plants, except food 08/05/2015    Contact dermatitis due to poison ivy    Body mass index (BMI) pediatric, 5th percentile to less than 85th percentile for age 10/16/2019    BMI (body mass index), pediatric, 5% to less than 85% for age    Encounter for immunization 11/20/2020    Encounter for administration of vaccine    Encounter for routine child health examination without abnormal findings 10/17/2019    Encounter for routine child health examination without abnormal findings    Encounter for routine child health examination without abnormal findings 11/17/2017    Encounter for routine child health examination w/o abnormal findings    Hypoglycemia 10/04/2023    Iliotibial band syndrome, unspecified leg 03/17/2017    Iliotibial band syndrome    Immunization not carried out because of patient decision for unspecified reason 10/16/2019    Immunization not carried out because of patient decision    Other  acute nonsuppurative otitis media, unspecified ear 08/05/2015    Acute nonsuppurative otitis media    Other conditions influencing health status 02/22/2017    History of cough    Other infective otitis externa, unspecified ear 07/29/2013    Infective otitis externa    Otitis media, unspecified, left ear 04/11/2019    Acute left otitis media    Otitis media, unspecified, unspecified ear 10/16/2019    Recurrent acute otitis media    Overweight 11/10/2017    Overweight, pediatric, BMI 85.0-94.9 percentile for age    Pain in left hip 03/17/2017    Left hip pain in pediatric patient    Pain in unspecified ankle and joints of unspecified foot 03/21/2018    Ankle pain    Personal history of diseases of the skin and subcutaneous tissue 11/18/2021    History of cellulitis    Personal history of diseases of the skin and subcutaneous tissue 09/21/2016    History of impetigo    Personal history of diseases of the skin and subcutaneous tissue 07/24/2015    History of sunburn    Personal history of other diseases of the nervous system and sense organs 02/22/2017    History of acute otitis media    Personal history of other diseases of the nervous system and sense organs 07/29/2013    History of acute otitis media    Personal history of other diseases of the respiratory system 02/22/2017    History of acute pharyngitis    Personal history of other diseases of the respiratory system 11/03/2021    History of acute pharyngitis    Personal history of other diseases of the respiratory system 02/18/2014    History of upper respiratory infection    Personal history of other drug therapy 11/10/2017    History of influenza vaccination    Personal history of other infectious and parasitic diseases 09/21/2016    History of viral exanthem    Rash and other nonspecific skin eruption 09/21/2016    Rash    Recurrent dislocation of right shoulder     Unspecified injury of left foot, initial encounter 02/19/2018    Injury of small toe, left,  initial encounter    Unspecified injury of unspecified ankle, initial encounter 03/21/2018    Ankle injury       Medication Documentation Review Audit       Reviewed by Maria G Srivastava MA (Medical Assistant) on 10/16/24 at 1522      Medication Order Taking? Sig Documenting Provider Last Dose Status            No Medications to Display                                   No Known Allergies    Social History     Socioeconomic History    Marital status: Single     Spouse name: Not on file    Number of children: Not on file    Years of education: Not on file    Highest education level: Not on file   Occupational History    Not on file   Tobacco Use    Smoking status: Never     Passive exposure: Never    Smokeless tobacco: Never   Vaping Use    Vaping status: Never Used   Substance and Sexual Activity    Alcohol use: Never    Drug use: Never    Sexual activity: Defer   Other Topics Concern    Not on file   Social History Narrative    Not on file     Social Drivers of Health     Financial Resource Strain: Not on file   Food Insecurity: Not on file   Transportation Needs: Not on file   Physical Activity: Not on file   Stress: Not on file   Social Connections: Not on file   Intimate Partner Violence: Not on file   Housing Stability: Not on file       Past Surgical History:   Procedure Laterality Date    SHOULDER ARTHROSCOPY W/ LABRAL REPAIR Right 06/2023    TONSILLECTOMY  04/24/2013    Tonsillectomy With Adenoidectomy    TYMPANOSTOMY TUBE PLACEMENT  04/24/2013    Ear Pressure Equalization Tube           Scribe Attestation  By signing my name below, Breanna DASILVA Scribe   attest that this documentation has been prepared under the direction and in the presence of Booker Lacey MD.

## 2025-01-28 ENCOUNTER — OFFICE VISIT (OUTPATIENT)
Dept: ORTHOPEDIC SURGERY | Facility: CLINIC | Age: 20
End: 2025-01-28
Payer: COMMERCIAL

## 2025-01-28 DIAGNOSIS — Z98.890 STATUS POST ARTHROSCOPY OF RIGHT SHOULDER: Primary | ICD-10-CM

## 2025-01-28 PROCEDURE — 99213 OFFICE O/P EST LOW 20 MIN: CPT | Performed by: ORTHOPAEDIC SURGERY

## 2025-03-10 ENCOUNTER — APPOINTMENT (OUTPATIENT)
Dept: PRIMARY CARE | Facility: CLINIC | Age: 20
End: 2025-03-10
Payer: COMMERCIAL

## 2025-04-01 ENCOUNTER — APPOINTMENT (OUTPATIENT)
Dept: ORTHOPEDIC SURGERY | Facility: CLINIC | Age: 20
End: 2025-04-01
Payer: COMMERCIAL

## 2025-06-05 ENCOUNTER — APPOINTMENT (OUTPATIENT)
Dept: ORTHOPEDIC SURGERY | Facility: CLINIC | Age: 20
End: 2025-06-05
Payer: COMMERCIAL

## 2025-06-05 NOTE — PROGRESS NOTES
History of Present Illness:  Date of Surgery: 06/12/24 right shoulder arthroscopy labral repair. ***     Imaging:  Xrays right shoulder: No acute fracture or dislocation. No arthritic changes.    Exam:  Mild effusion  Incisions healed  Shoulder External rotation 40 degrees compared to 50 on the right. 5/5 rotator cuff strength.   Forward flexion to 120  Minor pain with impingement  Good range of motion    Assessment:  Right shoulder revision arthroscopy bankart repair    Plan:  Continue working on strength and stability.  Ibuprofen and Ice as needed.   Follow-up in 2 months with x-ray 2-view shoulder at next visit.      Past Medical History:   Diagnosis Date    Achilles tendinitis, unspecified leg 03/21/2018    Achilles tendinitis    Acute upper respiratory infection, unspecified 04/11/2019    Acute upper respiratory infection    Allergic contact dermatitis due to plants, except food 08/05/2015    Contact dermatitis due to poison ivy    Body mass index (BMI) pediatric, 5th percentile to less than 85th percentile for age 10/16/2019    BMI (body mass index), pediatric, 5% to less than 85% for age    Encounter for immunization 11/20/2020    Encounter for administration of vaccine    Encounter for routine child health examination without abnormal findings 10/17/2019    Encounter for routine child health examination without abnormal findings    Encounter for routine child health examination without abnormal findings 11/17/2017    Encounter for routine child health examination w/o abnormal findings    Hypoglycemia 10/04/2023    Iliotibial band syndrome, unspecified leg 03/17/2017    Iliotibial band syndrome    Immunization not carried out because of patient decision for unspecified reason 10/16/2019    Immunization not carried out because of patient decision    Other acute nonsuppurative otitis media, unspecified ear 08/05/2015    Acute nonsuppurative otitis media    Other conditions influencing health status 02/22/2017     History of cough    Other infective otitis externa, unspecified ear 07/29/2013    Infective otitis externa    Otitis media, unspecified, left ear 04/11/2019    Acute left otitis media    Otitis media, unspecified, unspecified ear 10/16/2019    Recurrent acute otitis media    Overweight 11/10/2017    Overweight, pediatric, BMI 85.0-94.9 percentile for age    Pain in left hip 03/17/2017    Left hip pain in pediatric patient    Pain in unspecified ankle and joints of unspecified foot 03/21/2018    Ankle pain    Personal history of diseases of the skin and subcutaneous tissue 11/18/2021    History of cellulitis    Personal history of diseases of the skin and subcutaneous tissue 09/21/2016    History of impetigo    Personal history of diseases of the skin and subcutaneous tissue 07/24/2015    History of sunburn    Personal history of other diseases of the nervous system and sense organs 02/22/2017    History of acute otitis media    Personal history of other diseases of the nervous system and sense organs 07/29/2013    History of acute otitis media    Personal history of other diseases of the respiratory system 02/22/2017    History of acute pharyngitis    Personal history of other diseases of the respiratory system 11/03/2021    History of acute pharyngitis    Personal history of other diseases of the respiratory system 02/18/2014    History of upper respiratory infection    Personal history of other drug therapy 11/10/2017    History of influenza vaccination    Personal history of other infectious and parasitic diseases 09/21/2016    History of viral exanthem    Rash and other nonspecific skin eruption 09/21/2016    Rash    Recurrent dislocation of right shoulder     Unspecified injury of left foot, initial encounter 02/19/2018    Injury of small toe, left, initial encounter    Unspecified injury of unspecified ankle, initial encounter 03/21/2018    Ankle injury       Medication Documentation Review Audit        Reviewed by Maria G Srivastava MA (Medical Assistant) on 10/16/24 at 1522      Medication Order Taking? Sig Documenting Provider Last Dose Status            No Medications to Display                                   No Known Allergies    Social History     Socioeconomic History    Marital status: Single     Spouse name: Not on file    Number of children: Not on file    Years of education: Not on file    Highest education level: Not on file   Occupational History    Not on file   Tobacco Use    Smoking status: Never     Passive exposure: Never    Smokeless tobacco: Never   Vaping Use    Vaping status: Never Used   Substance and Sexual Activity    Alcohol use: Never    Drug use: Never    Sexual activity: Defer   Other Topics Concern    Not on file   Social History Narrative    Not on file     Social Drivers of Health     Financial Resource Strain: Not on file   Food Insecurity: Not on file   Transportation Needs: Not on file   Physical Activity: Not on file   Stress: Not on file   Social Connections: Not on file   Intimate Partner Violence: Not on file   Housing Stability: Not on file       Past Surgical History:   Procedure Laterality Date    SHOULDER ARTHROSCOPY W/ LABRAL REPAIR Right 06/2023    TONSILLECTOMY  04/24/2013    Tonsillectomy With Adenoidectomy    TYMPANOSTOMY TUBE PLACEMENT  04/24/2013    Ear Pressure Equalization Tube       Scribe Attestation:  By signing my name below, I, Flores Aldridge attest that this documentation has been prepared under the direction and in the presence of Booker Lacey MD.    Provider Attestation - Scribe documentation:  All medical record entries made by Breanna Azevedo were at my direction and personally dictated by me, Booker Lacey MD. I have reviewed the chart and agree that the record is accurate and I confirmed that it reflects my personal performance of the history, physical exam, discussion, and plan.

## 2025-06-11 PROBLEM — R59.0 INGUINAL LYMPHADENOPATHY: Status: ACTIVE | Noted: 2025-06-11

## 2025-06-11 PROBLEM — H69.93 EUSTACHIAN TUBE DYSFUNCTION, BILATERAL: Status: ACTIVE | Noted: 2017-10-12

## 2025-06-12 ENCOUNTER — HOSPITAL ENCOUNTER (OUTPATIENT)
Dept: RADIOLOGY | Facility: HOSPITAL | Age: 20
Discharge: HOME | End: 2025-06-12
Payer: COMMERCIAL

## 2025-06-12 ENCOUNTER — APPOINTMENT (OUTPATIENT)
Dept: ORTHOPEDIC SURGERY | Facility: CLINIC | Age: 20
End: 2025-06-12
Payer: COMMERCIAL

## 2025-06-12 ENCOUNTER — OFFICE VISIT (OUTPATIENT)
Dept: PRIMARY CARE | Facility: CLINIC | Age: 20
End: 2025-06-12
Payer: COMMERCIAL

## 2025-06-12 VITALS
WEIGHT: 169 LBS | SYSTOLIC BLOOD PRESSURE: 112 MMHG | BODY MASS INDEX: 25.03 KG/M2 | OXYGEN SATURATION: 98 % | HEART RATE: 48 BPM | DIASTOLIC BLOOD PRESSURE: 64 MMHG | TEMPERATURE: 97.6 F | HEIGHT: 69 IN

## 2025-06-12 DIAGNOSIS — Z98.890 STATUS POST ARTHROSCOPY OF RIGHT SHOULDER: ICD-10-CM

## 2025-06-12 DIAGNOSIS — R53.83 OTHER FATIGUE: Primary | ICD-10-CM

## 2025-06-12 DIAGNOSIS — Z98.890 STATUS POST LABRAL REPAIR OF SHOULDER: ICD-10-CM

## 2025-06-12 DIAGNOSIS — Z78.9 TAKES DIETARY SUPPLEMENTS: ICD-10-CM

## 2025-06-12 DIAGNOSIS — E55.9 VITAMIN D INSUFFICIENCY: ICD-10-CM

## 2025-06-12 DIAGNOSIS — R79.89 LOW TESTOSTERONE IN MALE: ICD-10-CM

## 2025-06-12 DIAGNOSIS — Z00.00 ANNUAL PHYSICAL EXAM: ICD-10-CM

## 2025-06-12 PROCEDURE — 73030 X-RAY EXAM OF SHOULDER: CPT | Mod: RIGHT SIDE | Performed by: RADIOLOGY

## 2025-06-12 PROCEDURE — 73030 X-RAY EXAM OF SHOULDER: CPT | Mod: RT

## 2025-06-12 NOTE — ASSESSMENT & PLAN NOTE
PREVENTIVE CARE SCREENING:  - Labs/ Lipid screen: utd   - Tdap: utd   - Discussed DIET - he's doing great with this! Encouraged he continue with whole food diet with lots of vegetables, legumes, whole grains, fruits, lean meats, avoid processed foods, high saturated fat/greasy foods, sugary foods and drinks, fast foods/dining out.   - Discussed EXERCISE -  He's doing great with this! Weight training and cardio mix 6 days a week.    - Encouraged pt to get yearly eye and dental exams  - Avoid cigarette smoking.   - Limit or abstain from alcohol consumption.

## 2025-06-12 NOTE — PROGRESS NOTES
Subjective   Patient ID: Merritt Brown is a 19 y.o. male who presents for Establish Care (New pt here today to [facility]; previously seen Peds-Dr Florence. Pt is following up from seeing Dr Florence; states testosterone is always low and was told to get rechecked again and if still low to see Endo; pt is always tired as well. ).    HPI     Preventive:   - Lives at home in [place] with his girlfriend (Elizabeth)  - Employment -  at the [employer], works second shift 3-11pm 7 days a week    - Labs: utd   - Td: 3/7/24   - Diet: good, cooks at home, drinks vitamin water, one [employer] daily   - Exercise: works out 6 days a week - wt training and cardio   - Sexual hx: sometimes, no trouble with it   - Tobacco: chew   - THC/cannabis: no  - Illicit drugs: no  - Alcohol: not often   - Mood: good   - Dentist visits: utd   - Eye exams: n/a     Fatigue:  - Past 1 year, recently a lot more   - In the mornings, has trouble getting up. Tries to get up at 9 am but usually sleeps until 10:30  - As the day goes on, has a fall off in energy   - Feels run down, no sleepy   - He does not snore   - Sex drive is normal and no ED   - Not able to put on as much muscle, harder to lift weights than it was previously   - Doesn't feel that the shift work is the problem - has been going on even          Active Problem List  Patient Active Problem List   Diagnosis    Glenoid labrum tear    CTS (carpal tunnel syndrome)    Arthritis, wrist    Acute postoperative pain    Type I SLAP lesion, right, subsequent encounter    Right shoulder pain    Superior glenoid labrum lesion of right shoulder    Weakness of right shoulder    Eustachian tube dysfunction, bilateral    Inguinal lymphadenopathy    Annual physical exam        Comprehensive Medical/Surgical/Social/Family History  Social History     Socioeconomic History    Marital status: Single   Tobacco Use    Smoking status: Never     Passive exposure: Never    Smokeless tobacco: Current     Types:  "Chew   Vaping Use    Vaping status: Never Used   Substance and Sexual Activity    Alcohol use: Not Currently    Drug use: Never    Sexual activity: Defer        Family History   Problem Relation Name Age of Onset    No Known Problems Mother      Hypertension Father      Asthma Sister      Asthma Brother      Hypertension Maternal Grandmother      Hyperlipidemia Maternal Grandmother      Diabetes Maternal Grandmother      Colon cancer Maternal Grandfather      No Known Problems Paternal Grandmother      Stroke Paternal Grandfather          Past Surgical History:   Procedure Laterality Date    SHOULDER ARTHROSCOPY W/ LABRAL REPAIR Right 06/2023    TONSILLECTOMY  04/24/2013    Tonsillectomy With Adenoidectomy    TYMPANOSTOMY TUBE PLACEMENT  04/24/2013    Ear Pressure Equalization Tube          Allergies and Medication List  No Known Allergies     No current outpatient medications on file.       Review of Systems  Nothing concerning except as noted in the HPI    Objective   /64   Pulse (!) 48   Temp 36.4 °C (97.6 °F)   Ht 1.74 m (5' 8.5\")   Wt 76.7 kg (169 lb)   SpO2 98%   BMI 25.32 kg/m²     Physical Exam  Constitutional:       General: He is not in acute distress.     Appearance: Normal appearance.   HENT:      Head: Normocephalic.      Right Ear: Tympanic membrane and ear canal normal.      Left Ear: Tympanic membrane and ear canal normal.      Nose: Nose normal.      Mouth/Throat:      Mouth: Mucous membranes are moist.      Pharynx: Oropharynx is clear.   Eyes:      Extraocular Movements: Extraocular movements intact.      Conjunctiva/sclera: Conjunctivae normal.      Pupils: Pupils are equal, round, and reactive to light.   Neck:      Thyroid: No thyroid mass, thyromegaly or thyroid tenderness.      Vascular: No carotid bruit.   Cardiovascular:      Rate and Rhythm: Normal rate and regular rhythm.      Pulses: Normal pulses.      Heart sounds: No murmur heard.  Pulmonary:      Effort: Pulmonary effort " is normal.      Breath sounds: Normal breath sounds. No wheezing, rhonchi or rales.   Abdominal:      General: Bowel sounds are normal. There is no distension.      Palpations: Abdomen is soft. There is no mass.      Tenderness: There is no abdominal tenderness. There is no guarding.   Musculoskeletal:         General: Normal range of motion.      Cervical back: Neck supple.   Lymphadenopathy:      Cervical: No cervical adenopathy.   Skin:     General: Skin is warm and dry.      Findings: No lesion or rash.   Neurological:      General: No focal deficit present.      Mental Status: He is alert.      Gait: Gait normal.   Psychiatric:         Mood and Affect: Mood normal. Affect is blunt.         Assessment/Plan     Problem List Items Addressed This Visit       Annual physical exam    Overview   - Lives in [place] with his girlfriend (Elizabeth)  - Employment -  at the [employer], works second shift 3-11pm 7 days a week    - Tdap 3/7/24 - next due 3/2034         Current Assessment & Plan   PREVENTIVE CARE SCREENING:  - Labs/ Lipid screen: utd   - Tdap: utd   - Discussed DIET - he's doing great with this! Encouraged he continue with whole food diet with lots of vegetables, legumes, whole grains, fruits, lean meats, avoid processed foods, high saturated fat/greasy foods, sugary foods and drinks, fast foods/dining out.   - Discussed EXERCISE -  He's doing great with this! Weight training and cardio mix 6 days a week.    - Encouraged pt to get yearly eye and dental exams  - Avoid cigarette smoking.   - Limit or abstain from alcohol consumption.          Relevant Orders    Follow Up In [facility] - PCP     Other Visit Diagnoses         Other fatigue    -  Primary    Relevant Orders    Mononucleosis screen    Sally-Barr Virus Antibody Panel    Vitamin B12    Sex Hormone Binding Globulin    FSH & LH    Transferrin    Iron and TIBC    Vitamin D 25-Hydroxy,Total (for eval of Vitamin D levels)    Thyroxine,  Free    TSH    Testosterone,Free and Total    Comprehensive metabolic panel    [facility]      Low testosterone in male        Relevant Orders    Sex Hormone Binding Globulin    FSH & LH    Transferrin    Iron and TIBC    Thyroxine, Free    TSH    Testosterone,Free and Total    Comprehensive metabolic panel    [facility]      Takes dietary supplements          Vitamin D insufficiency        Relevant Orders    Vitamin D 25-Hydroxy,Total (for eval of Vitamin D levels)                          Merritt Brown - be aware that any referrals discussed should be placed today and tests that were ordered should result in prompt scheduling today.   If not done today-then a phone call for scheduling is expected in a timely manner (within 2 weeks).   If testing is to be done-a result should be available to patient within 2 weeks time unless otherwise specified.   You, the patient or caregiver, are responsible for making sure that what was discussed is actually scheduled and completed.  If suboptimal understanding of results of tests or referral reason-a follow up appointment with me should be made.  If above does not occur-you are to connect with us for an explanation.

## 2025-06-16 ENCOUNTER — LAB (OUTPATIENT)
Dept: LAB | Facility: HOSPITAL | Age: 20
End: 2025-06-16
Payer: COMMERCIAL

## 2025-06-16 DIAGNOSIS — R79.89 OTHER SPECIFIED ABNORMAL FINDINGS OF BLOOD CHEMISTRY: Primary | ICD-10-CM

## 2025-06-16 DIAGNOSIS — R79.89 LOW TESTOSTERONE IN MALE: ICD-10-CM

## 2025-06-16 DIAGNOSIS — E55.9 VITAMIN D DEFICIENCY, UNSPECIFIED: ICD-10-CM

## 2025-06-16 DIAGNOSIS — Z78.9 TAKES DIETARY SUPPLEMENTS: ICD-10-CM

## 2025-06-16 DIAGNOSIS — R53.83 OTHER FATIGUE: ICD-10-CM

## 2025-06-16 LAB
25(OH)D3 SERPL-MCNC: 23 NG/ML (ref 30–100)
ALBUMIN SERPL BCP-MCNC: 4.9 G/DL (ref 3.4–5)
ALP SERPL-CCNC: 56 U/L (ref 33–120)
ALT SERPL W P-5'-P-CCNC: 18 U/L (ref 10–52)
ANION GAP SERPL CALC-SCNC: 11 MMOL/L (ref 10–20)
AST SERPL W P-5'-P-CCNC: 20 U/L (ref 9–39)
BILIRUB SERPL-MCNC: 0.8 MG/DL (ref 0–1.2)
BUN SERPL-MCNC: 21 MG/DL (ref 6–23)
CALCIUM SERPL-MCNC: 9.8 MG/DL (ref 8.6–10.3)
CHLORIDE SERPL-SCNC: 103 MMOL/L (ref 98–107)
CO2 SERPL-SCNC: 28 MMOL/L (ref 21–32)
CREAT SERPL-MCNC: 1.05 MG/DL (ref 0.5–1.3)
EBV EA IGG SER QL: NEGATIVE
EBV NA AB SER QL: POSITIVE
EBV VCA IGG SER IA-ACNC: POSITIVE
EBV VCA IGM SER IA-ACNC: NEGATIVE
EGFRCR SERPLBLD CKD-EPI 2021: >90 ML/MIN/1.73M*2
FSH SERPL-ACNC: 2.7 IU/L
GLUCOSE SERPL-MCNC: 87 MG/DL (ref 74–99)
HETEROPH AB SERPLBLD QL IA.RAPID: NEGATIVE
INSULIN P FAST SERPL-ACNC: 3 UIU/ML (ref 3–25)
IRON SATN MFR SERPL: 69 % (ref 25–45)
IRON SERPL-MCNC: 246 UG/DL (ref 35–150)
LH SERPL-ACNC: 5.5 IU/L
POTASSIUM SERPL-SCNC: 4.1 MMOL/L (ref 3.5–5.3)
PROT SERPL-MCNC: 7.2 G/DL (ref 6.4–8.2)
SODIUM SERPL-SCNC: 138 MMOL/L (ref 136–145)
T4 FREE SERPL-MCNC: 0.91 NG/DL (ref 0.61–1.12)
TIBC SERPL-MCNC: 359 UG/DL (ref 240–445)
TRANSFERRIN SERPL-MCNC: 251 MG/DL (ref 200–360)
TSH SERPL-ACNC: 1.68 MIU/L (ref 0.44–3.98)
UIBC SERPL-MCNC: 113 UG/DL (ref 110–370)
VIT B12 SERPL-MCNC: 765 PG/ML (ref 211–911)

## 2025-06-16 PROCEDURE — 83002 ASSAY OF GONADOTROPIN (LH): CPT | Mod: ELYLAB

## 2025-06-16 PROCEDURE — 80053 COMPREHEN METABOLIC PANEL: CPT

## 2025-06-16 PROCEDURE — 83550 IRON BINDING TEST: CPT | Mod: CCI

## 2025-06-16 PROCEDURE — 86308 HETEROPHILE ANTIBODY SCREEN: CPT

## 2025-06-16 PROCEDURE — 84402 ASSAY OF FREE TESTOSTERONE: CPT

## 2025-06-16 PROCEDURE — 84439 ASSAY OF FREE THYROXINE: CPT

## 2025-06-16 PROCEDURE — 36415 COLL VENOUS BLD VENIPUNCTURE: CPT

## 2025-06-16 PROCEDURE — 86663 EPSTEIN-BARR ANTIBODY: CPT | Mod: ELYLAB

## 2025-06-16 PROCEDURE — 83525 ASSAY OF INSULIN: CPT | Mod: ELYLAB

## 2025-06-16 PROCEDURE — 82306 VITAMIN D 25 HYDROXY: CPT | Mod: ELYLAB

## 2025-06-16 PROCEDURE — 84270 ASSAY OF SEX HORMONE GLOBUL: CPT

## 2025-06-16 PROCEDURE — 84466 ASSAY OF TRANSFERRIN: CPT | Mod: CCI

## 2025-06-16 PROCEDURE — 84443 ASSAY THYROID STIM HORMONE: CPT

## 2025-06-16 PROCEDURE — 83540 ASSAY OF IRON: CPT

## 2025-06-16 PROCEDURE — 82607 VITAMIN B-12: CPT | Mod: ELYLAB

## 2025-06-18 LAB — SHBG SERPL-SCNC: 34 NMOL/L (ref 17–56)

## 2025-06-21 ENCOUNTER — APPOINTMENT (OUTPATIENT)
Dept: PRIMARY CARE | Facility: CLINIC | Age: 20
End: 2025-06-21
Payer: COMMERCIAL

## 2025-06-21 NOTE — ED TRIAGE NOTES
Pt presents to ER with c/o right shoulder pain/injury s/p falling while skateboarding.  Pt has a history of dislocating the same shoulder a couple times, as well as labrum repair surgery.  
36.8

## 2025-06-24 LAB
TESTOSTERONE FREE (CHAN): 90.8 PG/ML (ref 35–155)
TESTOSTERONE,TOTAL,LC-MS/MS: 525 NG/DL (ref 250–1100)

## 2025-08-19 ENCOUNTER — APPOINTMENT (OUTPATIENT)
Dept: PRIMARY CARE | Facility: CLINIC | Age: 20
End: 2025-08-19
Payer: COMMERCIAL

## 2026-06-17 ENCOUNTER — APPOINTMENT (OUTPATIENT)
Dept: PRIMARY CARE | Facility: CLINIC | Age: 21
End: 2026-06-17
Payer: COMMERCIAL

## (undated) DEVICE — SUTURELASSO, 45 DEGREE CRV RIGHT

## (undated) DEVICE — STRAP, ARM BOARD, 32 X 1.5

## (undated) DEVICE — PREP, IODOPHOR, W/ALCOHOL, DURAPREP, W/APPLICATOR, 26 CC

## (undated) DEVICE — TIP, SUCTION, YANKAUER, FLEXIBLE

## (undated) DEVICE — GLOVE, SURGICAL, PROTEXIS PI BLUE W/NEUTHERA, 8.0, PF, LF

## (undated) DEVICE — GLOVE, SURGICAL, ORTHO, PROTEXIS, HYDROGEL, 8.0, PF, LATEX

## (undated) DEVICE — BLADE, STRYKER, 4.0MM, RESECTOR, F-SERIES

## (undated) DEVICE — STOCKINETTE, IMPERVIOUS, LARGE, 9IN X 48IN

## (undated) DEVICE — SUTURELASSO, 90D STRAIGHT

## (undated) DEVICE — GLOVE, SURGICAL, PROTEXIS PI , 6.5, PF, LF

## (undated) DEVICE — GLOVE, SURGICAL, PROTEXIS PI , 7.0, PF, LF

## (undated) DEVICE — GOWN, SURGICAL, ROYAL SILK, XL, STERILE

## (undated) DEVICE — HOLSTER, ELECTROSURGERY ACCESSORY, STERILE

## (undated) DEVICE — TAPE, SURGICAL, FOAM, MICROFOAM, HYPOALLERGENIC, 4 IN X 5.5 YD

## (undated) DEVICE — MAT, FLOOR, STANDARD FLUID BARRIER, 32X44, GREEN

## (undated) DEVICE — MANIFOLD, 4 PORT NEPTUNE STANDARD

## (undated) DEVICE — GLOVE, SURGICAL, PROTEXIS PI , 8.0, PF, LF

## (undated) DEVICE — TUBING, SUCTION, 6MM X 10, CLEAN N-COND

## (undated) DEVICE — BANDAGE, COFLEX, 4 X 5 YDS, FOAM TAN, STERILE, LF

## (undated) DEVICE — HEAD POSITIONER, UNIVERSAL, DISP

## (undated) DEVICE — GLOVE, SURGICAL, PROTEXIS PI BLUE W/NEUTHERA, 7.0, PF, LF

## (undated) DEVICE — Device

## (undated) DEVICE — SOLUTION, TOPICAL, ALCOHOL, ISOPROPYL 70%, 16 OZ

## (undated) DEVICE — CANNULA, GEMINI SR8

## (undated) DEVICE — PROBE, CRUISE ENERGY, ARTHOSCOPIC SERFAS 90-S 4.0MM

## (undated) DEVICE — DRAPE, SHEET, U, W/ADHESIVE STRIP, IMPERVIOUS, 60 X 70 IN, DISPOSABLE, LF, STERILE

## (undated) DEVICE — POSITIONER, CRADLE, HEAD, MEDC, FOAM SLOTTED

## (undated) DEVICE — PROTECTOR, NERVE, ULNAR, PINK

## (undated) DEVICE — DRESSING, ABDOMINAL PAD, CURITY, 7.5 X 8 IN

## (undated) DEVICE — SOLUTION, IRRIGATION, USP, SODIUM CHLORIDE 0.9%, 3000 ML

## (undated) DEVICE — GOWN, SURGICAL, IMPLT, BACK, XLARGE, XLONG, STERILE

## (undated) DEVICE — SUTURE, FIBERLINK P 2, 26IN BRAIDED POLYBLEND, BLUE

## (undated) DEVICE — KIT, PERCUTANEOUS INSERTION, F/2.9MM PUSHLOCK

## (undated) DEVICE — TUBING, PUMP REDEUCE 8FT STERILE

## (undated) DEVICE — COVER, MAYO STAND, W/PAD, 23 IN, DISPOSABLE, PLASTIC, LF, STERILE

## (undated) DEVICE — DRESSING, GAUZE, SPONGE, 12 PLY, 4 X 4 IN, PLASTIC POUCH, STRL 10PK

## (undated) DEVICE — BURR, STRYKER, 4.0MM, BRL 6FLT

## (undated) DEVICE — STRAP, VELCRO, BODY, 4 X 60IN, NS

## (undated) DEVICE — KIT, BEACH CHAIR TRIMANO

## (undated) DEVICE — SUTURE, ETHIBOND, P2, V-37, 30 IN, GREEN

## (undated) DEVICE — TUBING, PATIENT 8FT STERILE

## (undated) DEVICE — NEEDLE, SCORPION, HD

## (undated) DEVICE — DRAPE, SHEET, 17 X 23 IN

## (undated) DEVICE — CANNULA, ARTHROSCOPIC TWIST-IN 7MM

## (undated) DEVICE — DRAPE, POUCH, IRRIGATION, 20 X 24, W/FILTER DRAINAGE

## (undated) DEVICE — FIBERTAK, CURVED KIT, DISP